# Patient Record
Sex: FEMALE | Race: WHITE
[De-identification: names, ages, dates, MRNs, and addresses within clinical notes are randomized per-mention and may not be internally consistent; named-entity substitution may affect disease eponyms.]

---

## 2018-11-28 ENCOUNTER — HOSPITAL ENCOUNTER (INPATIENT)
Dept: HOSPITAL 88 - ER | Age: 68
LOS: 4 days | Discharge: HOME | DRG: 389 | End: 2018-12-02
Attending: INTERNAL MEDICINE | Admitting: INTERNAL MEDICINE
Payer: MEDICARE

## 2018-11-28 VITALS — SYSTOLIC BLOOD PRESSURE: 137 MMHG | DIASTOLIC BLOOD PRESSURE: 69 MMHG

## 2018-11-28 VITALS — BODY MASS INDEX: 33.38 KG/M2 | HEIGHT: 60 IN | WEIGHT: 170 LBS

## 2018-11-28 DIAGNOSIS — T50.2X5A: ICD-10-CM

## 2018-11-28 DIAGNOSIS — K59.03: ICD-10-CM

## 2018-11-28 DIAGNOSIS — M06.9: ICD-10-CM

## 2018-11-28 DIAGNOSIS — I73.9: ICD-10-CM

## 2018-11-28 DIAGNOSIS — I08.3: ICD-10-CM

## 2018-11-28 DIAGNOSIS — D63.8: ICD-10-CM

## 2018-11-28 DIAGNOSIS — I77.819: ICD-10-CM

## 2018-11-28 DIAGNOSIS — E78.5: ICD-10-CM

## 2018-11-28 DIAGNOSIS — E66.01: ICD-10-CM

## 2018-11-28 DIAGNOSIS — E86.0: ICD-10-CM

## 2018-11-28 DIAGNOSIS — J44.9: ICD-10-CM

## 2018-11-28 DIAGNOSIS — T40.605A: ICD-10-CM

## 2018-11-28 DIAGNOSIS — I72.8: ICD-10-CM

## 2018-11-28 DIAGNOSIS — E87.1: ICD-10-CM

## 2018-11-28 DIAGNOSIS — E87.6: ICD-10-CM

## 2018-11-28 DIAGNOSIS — T38.0X5A: ICD-10-CM

## 2018-11-28 DIAGNOSIS — G89.29: ICD-10-CM

## 2018-11-28 DIAGNOSIS — M54.5: ICD-10-CM

## 2018-11-28 DIAGNOSIS — K56.7: Primary | ICD-10-CM

## 2018-11-28 DIAGNOSIS — N39.0: ICD-10-CM

## 2018-11-28 DIAGNOSIS — K43.9: ICD-10-CM

## 2018-11-28 DIAGNOSIS — T39.395A: ICD-10-CM

## 2018-11-28 DIAGNOSIS — I48.91: ICD-10-CM

## 2018-11-28 DIAGNOSIS — E66.9: ICD-10-CM

## 2018-11-28 LAB
ALBUMIN SERPL-MCNC: 4 G/DL (ref 3.5–5)
ALBUMIN/GLOB SERPL: 1.3 {RATIO} (ref 0.8–2)
ALP SERPL-CCNC: 65 IU/L (ref 40–150)
ALT SERPL-CCNC: 15 IU/L (ref 0–55)
ANION GAP SERPL CALC-SCNC: 15 MMOL/L (ref 8–16)
BACTERIA URNS QL MICRO: (no result) /HPF
BASOPHILS # BLD AUTO: 0.1 10*3/UL (ref 0–0.1)
BASOPHILS NFR BLD AUTO: 0.4 % (ref 0–1)
BILIRUB UR QL: NEGATIVE
BUN SERPL-MCNC: 16 MG/DL (ref 7–26)
BUN/CREAT SERPL: 21 (ref 6–25)
CALCIUM SERPL-MCNC: 9.7 MG/DL (ref 8.4–10.2)
CHLORIDE SERPL-SCNC: 87 MMOL/L (ref 98–107)
CK MB SERPL-MCNC: 3.4 NG/ML (ref 0–5)
CK SERPL-CCNC: 84 IU/L (ref 29–168)
CLARITY UR: CLEAR
CO2 SERPL-SCNC: 24 MMOL/L (ref 22–29)
COLOR UR: YELLOW
DEPRECATED NEUTROPHILS # BLD AUTO: 8.7 10*3/UL (ref 2.1–6.9)
DEPRECATED RBC URNS MANUAL-ACNC: (no result) /HPF (ref 0–5)
EGFRCR SERPLBLD CKD-EPI 2021: > 60 ML/MIN (ref 60–?)
EOSINOPHIL # BLD AUTO: 0.1 10*3/UL (ref 0–0.4)
EOSINOPHIL NFR BLD AUTO: 0.5 % (ref 0–6)
EPI CELLS URNS QL MICRO: (no result) /LPF
ERYTHROCYTE [DISTWIDTH] IN CORD BLOOD: 14.6 % (ref 11.7–14.4)
GLOBULIN PLAS-MCNC: 3 G/DL (ref 2.3–3.5)
GLUCOSE SERPLBLD-MCNC: 89 MG/DL (ref 74–118)
HCT VFR BLD AUTO: 33.8 % (ref 34.2–44.1)
HGB BLD-MCNC: 11.9 G/DL (ref 12–16)
KETONES UR QL STRIP.AUTO: (no result)
LEUKOCYTE ESTERASE UR QL STRIP.AUTO: NEGATIVE
LYMPHOCYTES # BLD: 2.4 10*3/UL (ref 1–3.2)
LYMPHOCYTES NFR BLD AUTO: 18.7 % (ref 18–39.1)
LYMPHOCYTES NFR BLD MANUAL: 18 % (ref 19–48)
MCH RBC QN AUTO: 33.1 PG (ref 28–32)
MCHC RBC AUTO-ENTMCNC: 35.2 G/DL (ref 31–35)
MCV RBC AUTO: 94.2 FL (ref 81–99)
MONOCYTES # BLD AUTO: 1.6 10*3/UL (ref 0.2–0.8)
MONOCYTES NFR BLD AUTO: 12.5 % (ref 4.4–11.3)
MONOCYTES NFR BLD MANUAL: 15 % (ref 3.4–9)
NEUTS SEG NFR BLD AUTO: 67.4 % (ref 38.7–80)
NEUTS SEG NFR BLD MANUAL: 65 % (ref 40–74)
NITRITE UR QL STRIP.AUTO: NEGATIVE
PLAT MORPH BLD: (no result)
PLATELET # BLD AUTO: 474 X10E3/UL (ref 140–360)
PLATELET # BLD EST: (no result) 10*3/UL
POTASSIUM SERPL-SCNC: 3 MMOL/L (ref 3.5–5.1)
PROT UR QL STRIP.AUTO: NEGATIVE
RBC # BLD AUTO: 3.59 X10E6/UL (ref 3.6–5.1)
RBC MORPH BLD: NORMAL
SODIUM SERPL-SCNC: 123 MMOL/L (ref 136–145)
SP GR UR STRIP: 1.01 (ref 1.01–1.02)
UROBILINOGEN UR STRIP-MCNC: 0.2 MG/DL (ref 0.2–1)
WBC #/AREA URNS HPF: (no result) /HPF (ref 0–5)

## 2018-11-28 PROCEDURE — 80053 COMPREHEN METABOLIC PANEL: CPT

## 2018-11-28 PROCEDURE — 93306 TTE W/DOPPLER COMPLETE: CPT

## 2018-11-28 PROCEDURE — 82746 ASSAY OF FOLIC ACID SERUM: CPT

## 2018-11-28 PROCEDURE — 94640 AIRWAY INHALATION TREATMENT: CPT

## 2018-11-28 PROCEDURE — 80048 BASIC METABOLIC PNL TOTAL CA: CPT

## 2018-11-28 PROCEDURE — 84466 ASSAY OF TRANSFERRIN: CPT

## 2018-11-28 PROCEDURE — 86850 RBC ANTIBODY SCREEN: CPT

## 2018-11-28 PROCEDURE — 82607 VITAMIN B-12: CPT

## 2018-11-28 PROCEDURE — 71275 CT ANGIOGRAPHY CHEST: CPT

## 2018-11-28 PROCEDURE — 51700 IRRIGATION OF BLADDER: CPT

## 2018-11-28 PROCEDURE — 74174 CTA ABD&PLVS W/CONTRAST: CPT

## 2018-11-28 PROCEDURE — 82550 ASSAY OF CK (CPK): CPT

## 2018-11-28 PROCEDURE — 99283 EMERGENCY DEPT VISIT LOW MDM: CPT

## 2018-11-28 PROCEDURE — 85730 THROMBOPLASTIN TIME PARTIAL: CPT

## 2018-11-28 PROCEDURE — 82553 CREATINE MB FRACTION: CPT

## 2018-11-28 PROCEDURE — 36415 COLL VENOUS BLD VENIPUNCTURE: CPT

## 2018-11-28 PROCEDURE — 96361 HYDRATE IV INFUSION ADD-ON: CPT

## 2018-11-28 PROCEDURE — 85025 COMPLETE CBC W/AUTO DIFF WBC: CPT

## 2018-11-28 PROCEDURE — 83540 ASSAY OF IRON: CPT

## 2018-11-28 PROCEDURE — 74018 RADEX ABDOMEN 1 VIEW: CPT

## 2018-11-28 PROCEDURE — 81001 URINALYSIS AUTO W/SCOPE: CPT

## 2018-11-28 PROCEDURE — 86900 BLOOD TYPING SEROLOGIC ABO: CPT

## 2018-11-28 PROCEDURE — 84484 ASSAY OF TROPONIN QUANT: CPT

## 2018-11-28 RX ADMIN — SODIUM CHLORIDE SCH MLS/HR: 9 INJECTION, SOLUTION INTRAVENOUS at 18:04

## 2018-11-28 NOTE — XMS REPORT
Wilbert Locke MD

                             Created on: 2018



LIZY GREEN

External Reference #: [id number]

: 1950

Sex: Female



Demographics







                          Address                   [address] [place], [place]  [id number]

 

                          Home Phone                [phone]

 

                          Preferred Language        Unknown

 

                          Marital Status            Unknown

 

                          Taoist Affiliation     Unknown

 

                          Race                      Unknown

 

                          Ethnic Group              Non-





Author







                          Author                    Wilbert Locke

 

                          Organization              [employer]

 

                          Address                   Unknown

 

                          Phone                     Unavailable







Care Team Providers







                    Care Team Member Name    Role                Phone

 

                    Wilbert Locke     CP                  Unavailable



                                                                



Allergies

          No Known Allergies                                                    
                                   



Problems

          





             Problem Type    Condition    Code         Onset Dates    Condition Status

 

             Problem      Osteoporosis    M81.0                     Active

 

             Problem      Knee osteoarthritis    M17.9                     Active

 

             Problem      Vitamin D deficiency, unspecified    E55.9                     Active

 

                    Problem             Rheumatoid arthritis without rheumatoid factor, multiple sites    M06.09 

                                                    Active

 

             Problem      Other long term (current) drug therapy    Z79.899                   Active



                                                                                
                                               



Medications

          No Known Medications                                                  
                           



Results

          No Known Results                                                      
             



Summary Purpose

          [employer] Submission

## 2018-11-28 NOTE — XMS REPORT
Wilbert Locke MD

                             Created on: 2017



Catie GREEN

External Reference #: [id number]

: 1950

Sex: Female



Demographics







                          Address                   [address] [place], [place]  [id number]

 

                          Home Phone                [phone]

 

                          Preferred Language        Unknown

 

                          Marital Status            Unknown

 

                          Episcopalian Affiliation     Unknown

 

                          Race                      Unknown

 

                          Ethnic Group              Non-





Author







                          Author                    Justin Cat

 

                          Organization              [employer]

 

                          Address                   Unknown

 

                          Phone                     Unavailable







Care Team Providers







                    Care Team Member Name    Role                Phone

 

                    Justin Cat       CP                  Unavailable



                                                                



Allergies

          No Known Allergies                                                    
                                   



Problems

          





             Problem Type    Condition    Code         Onset Dates    Condition Status

 

             Problem      Other constipation    K59.09                    Active

 

                Problem         Encounter for long-term (current) use of other medications    Z79.899           

                                        Active

 

             Problem      Primary osteoarthritis of right knee    M17.11                    Active

 

             Problem      Pain in joint    M25.50                    Active

 

             Problem      Opioid use with opioid-induced disorder    F11.99                    Active

 

             Problem      Pain in left knee    M25.562                   Active

 

             Problem      Chronic pain syndrome    G89.4                     Active

 

             Problem      Rheumatoid arthritis    M06.9                     Active

 

             Problem      Pain in right knee    M25.561                   Active



                                                                                
                                                                                
      



Medications

          No Known Medications                                                  
                           



Results

          No Known Results                                                      
             



Summary Purpose

          [employer] Submission

## 2018-11-28 NOTE — XMS REPORT
Wilbert Locke MD

                             Created on: 2017



Catie GREEN

External Reference #: [id number]

: 1950

Sex: Female



Demographics







                          Address                   [address] [place], [place]  [id number]

 

                          Home Phone                [phone]

 

                          Preferred Language        Unknown

 

                          Marital Status            Unknown

 

                          Restorationism Affiliation     Unknown

 

                          Race                      Unknown

 

                          Ethnic Group              Non-





Author







                          Author                    Justin Cat

 

                          [facility]              [employer]

 

                          Address                   Unknown

 

                          Phone                     Unavailable







Care Team Providers







                    Care Team Member Name    Role                Phone

 

                    Justin Cat       CP                  Unavailable



                                                                



Allergies, Adverse Reactions, Alerts

          





                    Substance           Reaction            Event Type

 

                    AMOXICILLIAN        rash                Non Drug Allergy

 

                    CODINE              rash                Non Drug Allergy



                                                                                
                 



Problems

          





             Problem Type    Condition    Code         Onset Dates    Condition Status

 

             Problem      Pain in joint, lower leg    719.46                    Active

 

             Problem      Other constipation    K59.09                    Active

 

             Problem      Rheumatoid arthritis    714.0                     Active

 

             Problem      Rheumatoid arthritis    M06.9                     Active

 

             Assessment    Other constipation    K59.09                    Active

 

             Problem      Pain in joint    M25.50                    Active

 

             Assessment    Drug induced constipation    K59.03                    Active

 

             Assessment    Left shoulder pain, unspecified chronicity    M25.512                   Active

 

             Problem      Primary osteoarthritis of right knee    M17.11                    Active

 

             Problem      Pain in right knee    M25.561                   Active

 

                Problem         Encounter for long-term (current) use of other medications    Z79.899           

                                        Active

 

             Problem      Chronic pain syndrome    G89.4                     Active

 

             Problem      Pain in left knee    M25.562                   Active

 

                    Assessment          Encounter for long-term (current) use of other medications    Z79.899 

                                                    Active

 

             Assessment    Pain in right knee    M25.561                   Active

 

             Assessment    Pain in left knee    M25.562                   Active

 

             Assessment    Pain in joint    M25.50                    Active

 

             Problem      Long-term (current) use of other medications - High Risk    V58.69                    Active



 

             Problem      Chronic pain syndrome    338.4                     Active

 

             Assessment    Rheumatoid arthritis    M06.9                     Active

 

             Problem      Other constipation    564.09                    Active

 

             Assessment    Chronic pain syndrome    G89.4                     Active

 

             Problem      Polyarthritis, multiple sites    716.59                    Active



                                                                                
                                                                                
                                                                                
                                                                 



Medications

          





        Medication    Code System    Code    Instructions    Start Date    End Date    Status    Dosage



 

        [place]    NDC     [address]-20     MG Orally every 6 hrs                    Active    1 tablet 

as needed

 

        MethylPREDNISolone    NDC     [address]-33                            Active    not defined

 

        Methotrexate    NDC     0                               Active    not defined

 

        Levofloxacin    NDC     [address]-58    500 MG Orally Once a day                    Active    1 tablet



 

        Esomeprazole Magnesium    NDC     [address]-09                            Active    not defined

 

        Losartan    NDC     0                               Active    not defined

 

        Amitiza    NDC     41333514789    24 MCG                      Active    Take 1 capsule by mouth  twice

 a day with food

 

        Duloxetine HCl    NDC     [address]-78                            Active    not defined

 

        Methotrexate    NDC     38971348063    2.5 MG                      Active    Take 8 tablets by mouth 

 once weekly

 

        PredniSONE    NDC     12061419187    5 MG                      Active    TAKE 1 TABLET BY MOUTH ONCE 

DAILY WITH FOOD OR MILK

 

          Doxycycline Hyclate    NDC       [address]-45    100 MG Orally every 12 hrs                        Active

                                        1 capsule

 

        Fluconazole    NDC     [address]-95    100 MG Orally                     Active    1 tablet

 

        Norco    NDC     [address]-86     MG Orally every 6 hrs                    Active    1 tablet 

as needed

 

        Temazepam    NDC     [address]-10                            Active    not defined

 

        Atorvastatin Calcium    NDC     [address]-14                            Active    not defined

 

        Folic Acid    NDC     50932781002    1 MG                      Active    Take 1 tablet by mouth two  

times daily

 

        Proctozone-HC    NDC     [address]-37                            Active    not defined

 

        Doc-Q-Lace    NDC     [address]-28                            Active    not defined

 

        Diltiazem HCl    NDC     [address]-77                            Active    not defined



                                                                                
                                                                                
                                                                                
                                   



Vital Signs

          





                          Date/Time:                May 22, 2017

 

                          BMI                       43.23 Index

 

                          Weight                    228.8 lbs

 

                          Height                    61 in

 

                          Temperature               98.7 F

 

                          Cardiac Monitoring Heart Rate    84 /min

 

                          Blood Pressure Diastolic    72 mm Hg

 

                          Blood Pressure Systolic    124 mm Hg



                                                                              



Results

          No Known Results                                                      
             



Summary Purpose

          [employer] Submission

## 2018-11-28 NOTE — DIAGNOSTIC IMAGING REPORT
CT angiogram of the chest, abdomen and pelvis.



CPT code number: [id number]



History: Midthoracic back pain and chest pain when bending forward.



Technique: Multidetector helical CT angiography was carried out from the

thoracic inlet to the lesser trochanters before and following intravenous

contrast administration.  Thin section image collimation was utilized and 3-D

images were postprocessed on a separate workstation.  Parenchymal organ imaging

will be limited by arterial phase of contrast administration.



Dose reduction techniques used: Automated exposure control, adjustment of the

mAs and/or kVp according to patient size, standardized low-dose protocol,

and/or iterative reconstruction technique.



RADIATION DOSE:

     Total DLP: 1255.3 mGy*cm    

     Estimated effective dose: (DLP x 0.015 x size factor) mSv

     CTDIvol has been reviewed. It is below the limits set by the Radiation

Protocol Committee (RPC).



Comparison: CT abdomen/pelvis 12/27/2016, CT abdomen/pelvis 11/26/2015



CT scan thorax:



Thyroid/base of neck:  Low attenuating lesion the left lobe measures 5 mm. A

nodule in the right lobe measures 1.9 x 1.3 cm.  The remainder of the lower

neck is unremarkable.



Pleura: No pleural effusion or pleural based mass.



Heart: Mild cardiomegaly with left ventricular hypertrophy. No pericardial

effusion. Mild calcifications of the mitral valve.



Lymph nodes: No enlarged axillary, supraclavicular, mediastinal, or hilar lymph

nodes..



Pulmonary vessels: Main pulmonary artery measures 3.0 cm. No filling defects.



Pulmonary parenchyma: 



Right lung: Diffusely hyperinflated. No infiltrate or mass. Wispy bands of

subsegmental atelectasis in the middle and lower lobes.



Left lung: Diffusely hyperinflated. No mass or infiltrate.



CT scan of the abdomen:



Liver: Normal in attenuation without mass.



Spleen: Normal in attenuation and size without mass.



Pancreas: Mild fatty atrophy. No mass or ductal dilatation.



Adrenal glands: No evidence for mass..



Gallbladder: Absent. 



Biliary tree: The intrahepatic and intrahepatic bile ducts are diffusely

distended. The common bile duct measures 12 mm in diameter and tapers as it

approaches the ampulla. No intraluminal filling defects to suggest

choledocholithiasis.



Retroperitoneum: No retroperitoneal hemorrhage.



Kidneys: Punctate calculus in the upper pole of the right kidney. Symmetric

enhancement of the kidneys. No hydronephrosis. Low attenuating lesion in the

posterior upper pole of the right kidney measures 0.9 x 1.0 cm.



Bowel and mesentery: 



Esophagus: Patulous with dependently layering fluid in the lower esophagus.

There is narrowing of the esophagus as the descending thoracic aorta crosses

midline.



Stomach: Collapsed.

Small bowel: Small bowel loops are distended to 2.8 cm. Several small bowel

loops contain fluid. A large ventral abdominal wall hernia is redemonstrated

containing multiple loops of small and large bowel. No evidence of mesenteric

edema.



Large bowel: The majority of the large bowel including the cecum and appendix

are in the hernia. Diverticulosis coli is present without associated

inflammation. No large bowel dilatation.



CT scan pelvis:



Urinary bladder: Well distended and is normal. No ureteral dilatation.



Lymph nodes: No evidence of lymphadenopathy.



The uterus is present and normal in morphology. No adnexal mass.



No free fluid or fluid collection.



Soft tissues: Diastases of the rectus abdominis is redemonstrated with hernia

as described above.



Bones: Diffusely demineralized. Moderate degenerative changes of the spine.

There is stable grade 1 anterolisthesis of L4 on L5 without pars defects. There

is a treated compression fracture of a midthoracic vertebral body, likely T6

with vertebral plana and protrusion of the posterior cortex into the spinal

canal. There are no additional compression deformities. There are moderate

generative changes of the left shoulder.



CT angiography:



1. Aortic sinus: 3.4 cm.

2. Sinotubular junction: 3.0 cm

3. Proximal ascending aorta:3.8 x 3.8 cm

4. Proximal aortic arch:3.5 cm

5. Mid aortic arch:2.9 cm

6. Distal aortic arch:2.7 x 3.0 cm

7. Descending thoracic aorta:2.9 cm

8. Aorta at the diaphragm:2.7 cm

9. Aorta at the celiac artery:2.6 cm

10. Aorta at the cranial most renal artery:2.1 x 2.2 cm

11.  Aorta at the caudal-most renal artery:2.0 x 2.3 cm

12. Infrarenal aorta: 2.0 x 2.0 cm

13.  Bifurcation:1.7 x 1.9 cm

14.  Morphology:The thoracoabdominal aorta is markedly ectatic. The descending

thoracic aorta crosses to the right at the left atrium and then turns back to

midline at the diaphragmatic hiatus. There is no evidence of mural hematoma or

displaced mural calcification in the thoracic or abdominal aorta. No periaortic

inflammation. No evidence of dissection or penetrating ulcer.



There is ectasia of the great vessels. The left vertebral artery arises

directed from the aortic arch. No evidence of stenosis or dissection of the

great vessel origins.



Celiac artery: Mildly narrowed at the origin to 4 mm with dilatation to 9 mm at

the bifurcation of the hepatic and splenic arteries. Branching of the celiac

artery is conventional.



SMA: Origin measures 8 mm. No stenosis.



Renal arteries: 2 arteries supply the right kidney. No significant stenosis at

the origins of the main renal arteries.



KING: Patent.

Iliac arteries: Right common iliac artery measures 1.3 cm. Left common iliac

artery measures 1.2 cm. There is ectasia of the internal and external iliac

arteries.

Femoral arteries: Right femoral artery measures 1.2 cm. Left femoral artery

measures 0.9 cm.



IMPRESSION:



1.  No evidence of aortic dissection or mural hematoma. No retroperitoneal

hemorrhage.

2.  Markedly ectatic thoracoabdominal aorta as well as ectasia of the great

vessels of the neck and iliac arteries. Top normal size of the ascending aorta.

No significant burden of atherosclerotic plaque. No significant stenoses of the

visceral vasculature of the abdomen. Aneurysmal dilatation of the distal celiac

artery as described above. No associated thrombus.

3. Prominent main pulmonary artery suggestive of pulmonary artery hypertension.

No evidence of pulmonary embolus

4.  Treated compression fracture of the midthoracic spine with retropulsed

fragments into the spinal canal. No new or untreated compression deformities.

Stable grade 1 anterolisthesis of L4 on L5.

5. COPD.

6. Patulousness of the midesophagus containing a small amount of fluid. This is

likely due to extrinsic compression from the ectatic descending thoracic aorta.

7. Cholecystectomy with intrahepatic and intrahepatic biliary ductal dilatation

secondary to reservoir effect and ampullary stenosis. No evidence of

choledocholithiasis.

8.  Large ventral abdominal wall hernia containing small and large bowel.

Mildly prominent small bowel loops are suggestive of ileus or very low-grade

partial small bowel obstruction. No fluid in the hernia sac.

9. Diverticulosis coli. No evidence for bowel obstruction or inflammation.

10. Low attenuating lesion in the right kidney is likely a cyst. This can be

confirmed with ultrasound on an outpatient basis.

11. Thyroid nodules as described above. Recommend evaluation of the thyroid

gland with ultrasound.





Signed by: Dr. Tena Mi MD on 11/28/2018 5:31 PM

## 2018-11-28 NOTE — XMS REPORT
Wilbert Locke MD

                             Created on: 2017



Catie GREEN

External Reference #: [id number]

: 1950

Sex: Female



Demographics







                          Address                   [address], [place]  [id number]

 

                          Home Phone                [phone]

 

                          Preferred Language        Unknown

 

                          Marital Status            Unknown

 

                          Nondenominational Affiliation     Unknown

 

                          Race                      Unknown

 

                          Ethnic Group              Non-





Author







                          Author                    Justin Cat

 

                          [facility]              [employer]

 

                          Address                   Unknown

 

                          Phone                     Unavailable







Care Team Providers







                    Care Team Member Name    Role                Phone

 

                    Justin Cat       CP                  Unavailable



                                                                



Allergies, Adverse Reactions, Alerts

          





                    Substance           Reaction            Event Type

 

                    AMOXICILLIAN        rash                Non Drug Allergy

 

                    CODINE              rash                Non Drug Allergy



                                                                                
                 



Problems

          





             Problem Type    Condition    Code         Onset Dates    Condition Status

 

             Assessment    Chronic pain syndrome    G89.4                     Active

 

             Problem      Other constipation    K59.09                    Active

 

                Problem         Encounter for long-term (current) use of other medications    Z79.899           

                                        Active

 

             Problem      Primary osteoarthritis of right knee    M17.11                    Active

 

             Problem      Pain in joint    M25.50                    Active

 

             Problem      Opioid use with opioid-induced disorder    F11.99                    Active

 

             Problem      Pain in left knee    M25.562                   Active

 

             Problem      Chronic pain syndrome    G89.4                     Active

 

             Problem      Rheumatoid arthritis    M06.9                     Active

 

             Problem      Pain in right knee    M25.561                   Active

 

             Assessment    Opioid use with opioid-induced disorder    F11.99                    Active

 

             Assessment    Left shoulder pain, unspecified chronicity    M25.512                   Active

 

             Assessment    Pain in joint    M25.50                    Active

 

                    Assessment          Encounter for long-term (current) use of other medications    Z79.899 

                                                    Active

 

             Assessment    Drug induced constipation    K59.03                    Active

 

             Assessment    Pain in right knee    M25.561                   Active

 

             Assessment    Pain in left knee    M25.562                   Active

 

             Assessment    Rheumatoid arthritis    M06.9                     Active



                                                                                
                                                                                
                                                                                
               



Medications

          





        Medication    Code System    Code    Instructions    Start Date    End Date    Status    Dosage



 

        Duloxetine HCl    NDC     69620239882                            Active    not defined

 

        Proctozone-HC    NDC     26089757921                            Active    not defined

 

        Methotrexate    NDC     16255712955    2.5 MG                      Active    Take 8 tablets by mouth 

 once weekly

 

        Fluconazole    NDC     86606756303    100 MG Orally                     Active    1 tablet

 

        Temazepam    NDC     80577695194                            Active    not defined

 

             Clindamycin Phos-Benzoyl Perox    NDC          92342655173    1-5 % Externally Once a day     

                                        Active              1 application to affected area

 

        Methotrexate    NDC     0                               Active    not defined

 

        movantik    NDC     0       25mg oral daily                    Active    one tab

 

        Atorvastatin Calcium    NDC     20835612166                            Active    not defined

 

        MethylPREDNISolone    NDC     15669456703                            Active    not defined

 

        Losartan    NDC     0                               Active    not defined

 

          Metronidazole    NDC       68439166137    0.75 % Externally 1-2 times daily                        Active

                                        1 application to affected area

 

        Movantik    NDC     64993096347    25 MG Orally Once a day                    Active    1 tablet in the

 morning

 

        Doc-Q-Lace    NDC     95850771243                            Active    not defined

 

        Diltiazem HCl    NDC     33263952202                            Active    not defined

 

        Norco    NDC     65052953525     MG Orally every 6 hrs                    Active    1 tablet as

 needed

 

        Norco    NDC     23088080790     MG Orally every 6 hrs                    Active    1 tablet as

 needed

 

        Folic Acid    NDC     92332467566    1 MG                      Active    Take 1 tablet by mouth two  

times daily

 

        Esomeprazole Magnesium    NDC     06516579223                            Active    not defined

 

        PredniSONE    NDC     88231195846    5 MG                      Active    TAKE 1 TABLET BY MOUTH ONCE 

DAILY WITH FOOD OR MILK



                                                                                
                                                                                
                                                                                
                                             



Vital Signs

          





                          Date/Time:                Nov 10, 2017

 

                          BMI                       39.67 Index

 

                          Weight                    210 lbs

 

                          Height                    61 in

 

                          Temperature               98.5 F

 

                          Cardiac Monitoring Heart Rate    72 /min

 

                          Blood Pressure Diastolic    66 mm Hg

 

                          Blood Pressure Systolic    122 mm Hg



                                                                              



Results

          No Known Results                                                      
             



Summary Purpose

          eClinicalWorks Submission

## 2018-11-28 NOTE — XMS REPORT
Wilbert Locke MD

                             Created on: 2017



Catie GREEN

External Reference #: [id number]

: 1950

Sex: Female



Demographics







                          Address                   [address], [place]  [id number]

 

                          Home Phone                [phone]

 

                          Preferred Language        Unknown

 

                          Marital Status            Unknown

 

                          Amish Affiliation     Unknown

 

                          Race                      Unknown

 

                          Ethnic Group              Non-





Author







                          Author                    Justin Cat

 

                          [facility]              [employer]

 

                          Address                   Unknown

 

                          Phone                     Unavailable







Care Team Providers







                    Care Team Member Name    Role                Phone

 

                    Justin Cat       CP                  Unavailable



                                                                



Allergies, Adverse Reactions, Alerts

          





                    Substance           Reaction            Event Type

 

                    AMOXICILLIAN        rash                Non Drug Allergy

 

                    CODINE              rash                Non Drug Allergy



                                                                              



Encounters

          





                    Encounter           Location            Date

 

                    [facility]         Wilbert Locke MD    2017



                                                                                
       



Problems

          





             Problem Type    Condition    ICD-9 Code    Onset Dates    Condition Status

 

             Problem      Pain in joint, lower leg    719.46                    Active

 

             Problem      Other constipation    K59.09                    Active

 

             Problem      Rheumatoid arthritis    714.0                     Active

 

             Problem      Rheumatoid arthritis    M06.9                     Active

 

             Assessment    Other constipation    K59.09                    Active

 

             Problem      Pain in joint    M25.50                    Active

 

             Assessment    Drug induced constipation    K59.03                    Active

 

             Problem      Primary osteoarthritis of right knee    M17.11                    Active

 

             Problem      Pain in right knee    M25.561                   Active

 

                Problem         Encounter for long-term (current) use of other medications    Z79.899           

                                        Active

 

             Problem      Chronic pain syndrome    G89.4                     Active

 

             Problem      Pain in left knee    M25.562                   Active

 

                    Assessment          Encounter for long-term (current) use of other medications    Z79.899 

                                                    Active

 

             Assessment    Pain in right knee    M25.561                   Active

 

             Assessment    Pain in left knee    M25.562                   Active

 

             Assessment    Pain in joint    M25.50                    Active

 

             Problem      Long-term (current) use of other medications - High Risk    V58.69                    Active



 

             Problem      Chronic pain syndrome    338.4                     Active

 

             Assessment    Rheumatoid arthritis    M06.9                     Active

 

             Problem      Other constipation    564.09                    Active

 

             Assessment    Chronic pain syndrome    G89.4                     Active

 

             Problem      Polyarthritis, multiple sites    716.59                    Active



                                                                                
                                                                                
                                                                                
                                                       



Medications

          





        Medication    Code System    Code    Instructions    Start Date    End Date    Status    Dosage



 

        Losartan    Unknown    0                               Active    Unknown

 

        Methotrexate    [facility]    59852355299    2.5 MG                      Active    TAKE 8 TABLETS BY MOUTH

 ONCE WEEKLY

 

           Amitiza    [facility]    [address]-02    24 MCG Orally Twice a day                         Active                    1 capsule with food

 

        Azithromycin    [facility]    [address]-67                            Active    Unknown

 

        Levofloxacin    [facility]    [address]-64    500 MG Orally Once a day                    Active    1

 tablet

 

        Doc-Q-Lace    [facility]    [address]-74                            Active    Unknown

 

        Norco    [facility]    [address]-14     MG Orally every 6 hrs                    Active    1 tablet

 as needed

 

        Fluconazole    [facility]    [address]-69    100 MG Orally                     Active    1 tablet

 

        Folic Acid    [facility]    25979027875    1 MG                      Active    Take 1 tablet by mouth 

once a day

 

          Doxycycline Hyclate    [facility]    [address]-80    100 MG Orally every 12 hrs                        

Active                                  1 capsule

 

        Esomeprazole Magnesium    [facility]    [address]-28                            Active    Unknown

 

        Methotrexate    Unknown    0                               Active    Unknown

 

        Duloxetine HCl    [facility]    [address]-46                            Active    Unknown

 

        Norco    [facility]    [address]-68     MG Orally every 6 hrs                    Active    1 tablet

 as needed

 

        Fentanyl    [facility]    [address]-47    25 MCG/HR Transdermal                     Active    1 patch

 to skin

 

        Proctozone-HC    [facility]    [address]-60                            Active    Unknown

 

        Atorvastatin Calcium    [facility]    [address]-69                            Active    Unknown

 

        Temazepam    [facility]    [address]-85                            Active    Unknown

 

        MethylPREDNISolone    [facility]    [address]-10                            Active    Unknown

 

        Diltiazem HCl    [facility]    [address]-26                            Active    Unknown



                                                                                
                                                                                
                                                                                
                                   



Social History

          





                    Social History Element    Qualifiers          Date Reported

 

                    Tobacco Use:        .   Are you a:: never smoker     2017

 

                    Pets:               none.               2017

 

                    Marital Status:     single.             2017

 

                    Diet:               no.                 2017

 

                    Caffeine:           no.                 2017

 

                    Exercise:           no.                 2017

 

                    Alcohol:            no.                 2017

 

                    Travel outside [place]:    no.                 2017

 

                    Occup.  exposure:    none.               2017

 

                    Occupation:         unemployed.         2017



                                                                                
                                                                                
                          



Vital Signs

          





                          Date/Time:                2017

 

                          Blood Pressure Systolic    148 mm Hg

 

                          Height                    61 in

 

                          Temperature               98.4 F

 

                          Blood Pressure Diastolic    100 mm Hg



                                                                    



Summary Purpose

          [employer]ClinicalWorks Submission

## 2018-11-28 NOTE — XMS REPORT
Wilbert Locke MD

                             Created on: 2018



LIZY GREEN

External Reference #: [id number]

: 1950

Sex: Female



Demographics







                          Address                   [address] [place] [address]  [id number]

 

                          Home Phone                [phone]

 

                          Preferred Language        Unknown

 

                          Marital Status            Unknown

 

                          [employer] Affiliation     Unknown

 

                          Race                      Unknown

 

                          Ethnic Group              Non-





Author







                          Author                    Emperatriz Adam

 

                          [facility]              [employer]

 

                          Address                   Unknown

 

                          Phone                     Unavailable







Care Team Providers







                    Care Team Member Name    Role                Phone

 

                    Emperatriz Adam                      Unavailable



                                                                



Allergies, Adverse Reactions, Alerts

          





                    Substance           Reaction            Event Type

 

                    Remicade            infections          Non Drug Allergy

 

                    NSAIDS              GI upset            Non Drug Allergy

 

                    Aleve               GI upset            Non Drug Allergy

 

                    Motrin              GI upset            Non Drug Allergy

 

                    Talwin              GI upset            Non Drug Allergy

 

                    Codeine             GI upset            Non Drug Allergy



                                                                                
                                                         



Problems

          





             Problem Type    Condition    Code         Onset Dates    Condition Status

 

             Assessment    Osteoporosis    M81.0                     Active

 

             Problem      Osteoporosis    M81.0                     Active

 

             Problem      Knee osteoarthritis    M17.9                     Active

 

             Problem      Vitamin D deficiency, unspecified    E55.9                     Active

 

                    Assessment          Rheumatoid arthritis without rheumatoid factor, multiple sites    M06.09

                                                    Active

 

             Assessment    Other long term (current) drug therapy    Z79.899                   Active

 

                    Problem             Rheumatoid arthritis without rheumatoid factor, multiple sites    M06.09 

                                                    Active

 

             Problem      Other long term (current) drug therapy    Z79.899                   Active



                                                                                
                                                                             



Medications

          





        Medication    Code System    Code    Instructions    Start Date    End Date    Status    Dosage



 

        Xanax    NDC     18877355259    0.5 MG Orally Three times a day                    Active    1 tablet



 

        PredniSONE    NDC     58544460798    1 MG Orally Once a day                    Active    4 tablet

 

          Losartan Potassium-HCTZ    NDC       68160417549    100-12.5 MG Orally Once a day                        

Active                                  1 tablet

 

        Mirtazapine    NDC     06976219368    15 MG Orally Once a day                    Active    1 tablet before

 bedtime in the evening

 

        Duloxetine HCl    NDC     57574700271    60 MG Orally Once a day                    Active    1 capsule



 

        Methotrexate    NDC     98723492603    2.5 MG Orally once a week                    Active    take 10

 tablets

 

           ProAir HFA    NDC        81543955161    108 (90 Base) MCG/ACT Inhalation every 4 hrs                

                          Active                    2 puffs as needed

 

          Esomeprazole Magnesium    NDC       65950508848    40 MG Orally Once a day                        Active 

                                        1 capsule

 

        Folic Acid    NDC     83272300142    1 MG                      Active    TAKE 1 TABLET BY MOUTH TWO  

TIMES DAILY

 

        Meloxicam    NDC     74702806664    7.5 MG Orally Once a day                    Active    1 tablet

 

        movantik    NDC     0       25mg oral daily                    Active    one tab

 

        Temazepam    NDC     22009343683    30 MG Orally Once a day                    Active    1 capsule at

 bedtime as needed

 

           Hydrocodone-Acetaminophen    NDC        62580433891     MG Orally every 6 hrs                 

                          Active                    1 tablet as needed

 

        Prolia    ND     30979909768    60 MG/ML Subcutaneous                     Active    as directed

 

          Gentamicin Sulfate    NDC       43396656881    0.1 % Externally Three times a day                        

Active                                  1 application to affected area

 

        Atorvastatin Calcium    NDC     59848816134    40 MG Orally Once a day                    Active    1

 tablet



                                                                                
                                                                                
                                                                                
     



Vital Signs

          





                          Date/Time:                2018

 

                          BMI                       35.90 Index

 

                          Weight                    190 lbs

 

                          Height                    61 in

 

                          Temperature               98.9 F

 

                          Cardiac Monitoring Heart Rate    76 /min

 

                          Blood Pressure Diastolic    88 mm Hg

 

                          Blood Pressure Systolic    130 mm Hg



                                                                              



Results

          No Known Results                                                      
             



Summary Purpose

          [employer]ClinicalWorks Submission

## 2018-11-28 NOTE — XMS REPORT
Wilbert Locke MD

                             Created on: 2017



Catie GREEN

External Reference #: [id number]

: 1950

Sex: Female



Demographics







                          Address                   [address] [place], [place]  [id number]

 

                          Home Phone                [phone]

 

                          Preferred Language        Unknown

 

                          Marital Status            Unknown

 

                          Voodoo Affiliation     Unknown

 

                          Race                      Unknown

 

                          Ethnic Group              Non-





Author







                          Author                    Justin Cat

 

                          Organization              [employer]

 

                          Address                   Unknown

 

                          Phone                     Unavailable







Care Team Providers







                    Care Team Member Name    Role                Phone

 

                    Justin Cat       CP                  Unavailable



                                                                



Allergies

          No Known Allergies                                                    
                                   



Problems

          





             Problem Type    Condition    Code         Onset Dates    Condition Status

 

             Problem      Rheumatoid arthritis    714.0                     Active

 

                Problem         Encounter for long-term (current) use of other medications    Z79.899           

                                        Active

 

             Problem      Other constipation    K59.09                    Active

 

             Problem      Primary osteoarthritis of right knee    M17.11                    Active

 

             Problem      Rheumatoid arthritis    M06.9                     Active

 

             Problem      Opioid use with opioid-induced disorder    F11.99                    Active

 

             Problem      Pain in left knee    M25.562                   Active

 

             Problem      Pain in right knee    M25.561                   Active

 

             Problem      Pain in joint    M25.50                    Active

 

             Problem      Chronic pain syndrome    G89.4                     Active

 

             Problem      Chronic pain syndrome    338.4                     Active

 

             Problem      Other constipation    564.09                    Active

 

             Problem      Polyarthritis, multiple sites    716.59                    Active

 

             Problem      Long-term (current) use of other medications - High Risk    V58.69                    Active



 

             Problem      Pain in joint, lower leg    719.46                    Active



                                                                                
                                                                                
                                                                  



Medications

          No Known Medications                                                  
                           



Results

          No Known Results                                                      
             



Summary Purpose

          [employer] Submission

## 2018-11-28 NOTE — XMS REPORT
Wilbert Locke MD

                             Created on: 2017



Catie GREEN

External Reference #: [id number]

: 1950

Sex: Female



Demographics







                          Address                   [address] [place], [place]  [id number]

 

                          Home Phone                [phone]

 

                          Preferred Language        Unknown

 

                          Marital Status            Unknown

 

                          Islam Affiliation     Unknown

 

                          Race                      Unknown

 

                          Ethnic Group              Non-





Author







                          Author                    Justin Cat

 

                          Organization              [employer]

 

                          Address                   Unknown

 

                          Phone                     Unavailable







Care Team Providers







                    Care Team Member Name    Role                Phone

 

                    Justin Cat                         Unavailable



                                            



Encounters

          





                    Encounter           Location            Date

 

                    [facility]         Wilbert Locke MD    2017

 

                    needs pain patch    Wilbert Locke MD    2017

 

                    called back         Wilbert Locke MD    2017



                                                                                
                           



Problems

          





             Problem Type    Condition    ICD-9 Code    Onset Dates    Condition Status

 

             Problem      Pain in joint, lower leg    719.46                    Active

 

             Problem      Other constipation    K59.09                    Active

 

             Problem      Rheumatoid arthritis    714.0                     Active

 

             Problem      Rheumatoid arthritis    M06.9                     Active

 

             Problem      Pain in joint    M25.50                    Active

 

             Problem      Primary osteoarthritis of right knee    M17.11                    Active

 

             Problem      Pain in right knee    M25.561                   Active

 

                Problem         Encounter for long-term (current) use of other medications    Z79.899           

                                        Active

 

             Problem      Chronic pain syndrome    G89.4                     Active

 

             Problem      Pain in left knee    M25.562                   Active

 

             Problem      Long-term (current) use of other medications - High Risk    V58.69                    Active



 

             Problem      Chronic pain syndrome    338.4                     Active

 

             Problem      Other constipation    564.09                    Active

 

             Problem      Polyarthritis, multiple sites    716.59                    Active



                                                                                
                                                                                
                                                        



Social History

          





                    Social History Element    Qualifiers          Date Reported

 

                    Tobacco Use:        .   Are you a:: never smoker     2017

 

                    Pets:               none.               2017

 

                    Marital Status:     single.             2017

 

                    Diet:               no.                 2017

 

                    Caffeine:           no.                 2017

 

                    Exercise:           no.                 2017

 

                    Alcohol:            no.                 2017

 

                    Travel outside [place]:    no.                 2017

 

                    Occup.  exposure:    none.               2017

 

                    Occupation:         unemployed.         2017



                                                                                
                                                                             



Summary Purpose

          [employer] Submission

## 2018-11-28 NOTE — XMS REPORT
Wilbert Locke MD

                             Created on: 2017



Catie GREEN

External Reference #: [id number]

: 1950

Sex: Female



Demographics







                          Address                   [address] [place], [place]  [id number]

 

                          Home Phone                [phone]

 

                          Preferred Language        Unknown

 

                          Marital Status            Unknown

 

                          Moravian Affiliation     Unknown

 

                          Race                      Unknown

 

                          Ethnic Group              Non-





Author







                          Author                    Justin Cat

 

                          Organization              [employer]

 

                          Address                   Unknown

 

                          Phone                     Unavailable







Care Team Providers







                    Care Team Member Name    Role                Phone

 

                    Justin Cat       CP                  Unavailable



                                                                



Allergies

          No Known Allergies                                                    
                                   



Problems

          





             Problem Type    Condition    Code         Onset Dates    Condition Status

 

             Problem      Other constipation    K59.09                    Active

 

                Problem         Encounter for long-term (current) use of other medications    Z79.899           

                                        Active

 

             Problem      Primary osteoarthritis of right knee    M17.11                    Active

 

             Problem      Pain in joint    M25.50                    Active

 

             Problem      Opioid use with opioid-induced disorder    F11.99                    Active

 

             Problem      Pain in left knee    M25.562                   Active

 

             Problem      Chronic pain syndrome    G89.4                     Active

 

             Problem      Rheumatoid arthritis    M06.9                     Active

 

             Problem      Pain in right knee    M25.561                   Active



                                                                                
                                                                                
      



Medications

          No Known Medications                                                  
                           



Results

          No Known Results                                                      
             



Summary Purpose

          [employer] Submission

## 2018-11-28 NOTE — XMS REPORT
Continuity of Care Document

                             Created on: 2018



LIZY GREEN

External Reference #: [id number]

: 1950

Sex: Female



Demographics







                          Address                   [address] [place]  [address]

 

                          Home Phone                [phone]

 

                          Preferred Language        Unknown

 

                          Marital Status            Unknown

 

                          [place]ai Affiliation     Unknown

 

                          Race                      Unknown

 

                          Ethnic Group              Unknown





Author







                          Author                    [facility]              Interface

 

                          Address                   Unknown

 

                          Phone                     Unavailable



                                                    



Problems

                    





                    Problem                            Status                            Onset Date     

                          Classification                            Date Reported       

                          Comments                            Source                    

 

                    Rheumatoid arthritis                            Active                              

                          Problem                            09/15/2017              

                                                      Alejandro Locke                    

 

                          Encounter for long-term use of other medications                            Active

                                                        Problem                      

                    2018                                                        Alejandro Locke 

                   

 

                    Other constipation                            Active                                

                          Problem                            2018                

                                                      Alejandro Locke                    

 

                          Primary osteoarthritis of right knee                            Active            

                                                Problem                            2018

                                                        Alejandro Locke               

     

 

                    Rheumatoid arthritis                            Active                              

                          Problem                            2018              

                                                      Alejandro Locke                    

 

                          Opioid use with opioid-induced disorder                            Active         

                                                Problem                            2018

                                                        Alejandro Locke               

     

 

                    Pain in left knee                            Active                                 

                          Problem                            2018                 

                                                      Alejandro Locke                    

 

                    Pain in right knee                            Active                                

                          Problem                            2018                

                                                      Alejandro Locke                    

 

                    Pain in joint                            Active                                     

                          Problem                            2018                     

                                                      Alejandro Locke                    

 

                    Chronic pain syndrome                            Active                             

                           Problem                            2018             

                                                      Alejandro Locke                    

 

                    Chronic pain syndrome                            Active                             

                           Problem                            09/15/2017             

                                                      Alejandro Locke                    

 

                    Other constipation                            Active                                

                          Problem                            09/15/2017                

                                                      Alejandro Locke                    

 

                          Polyarthritis, multiple sites                            Active                   

                                                Problem                            09/15/2017     

                                                      Alejandro Locke                    



 

                          Long-term use of other medications - High Risk                            Active  

                                                      Problem                        

                    09/15/2017                                                        Alejandro Locke   

                 

 

                          Pain in joint, lower leg                            Active                        

                                                Problem                            09/15/2017          

                                                      Alejandro Locke                    

 

                          Drug induced constipation                            Active                       

                                                Diagnosis                            11/15/2017       

                                                      Alejandro Locke                    

 

                          Left shoulder pain, unspecified chronicity                            Active      

                                                  Diagnosis                            

11/15/2017                                                        Alejandro Locke     

               

 

                    Osteoporosis                            Active                                      

                          Problem                            2018                      

                                                      Alejandro Locke                    

 

                    Knee osteoarthritis                            Active                               

                          Problem                            2018               

                                                      Alejandro Locke                    

 

                          Vitamin D deficiency, unspecified                            Active               

                                                Problem                            2018 

                                                       Alejandro Locke                

    

 

                                        Rheumatoid arthritis without rheumatoid factor, multiple sites                  

                    Active                                                        Problem        

                    2018                                                        Alejandro Locke                    

 

                          Other long term drug therapy                            Active                    

                                                Problem                            2018      

                                                      Alejandro Locke                    



                                                                                
                                                                                
                                                                                
                                                                                
                                                                                
                   



Medications

                    





                    Medication                            Details                            Route      

                          Status                            Patient Instructions         

                          Ordering Provider                            Order Date           

                                        Source                    

 

                    PredniSONE                            4 tablet                            Orally    

                          Active                            1 MG Orally Once a day     

                          [facility]                            2018                  

                                        Alejandro Locke                    

 

                          Movantik                            1 tablet in the morning                       

                    Orally                            Active                            25 MG Orally Once

 a day                            [facility]                            2017       

                                        Alejandro Locke                    

 

                    movantik                            one tab                            oral         

                          Active                            25mg oral daily                 

                    [facility]                            2017                            Alejandro Locke                    

 

                          Amitiza                            Take 1 capsule by mouth  twice a day with food 

                           NA                            Active                      

                    24 MCG                            [facility]                            2017   

                                        Alejandro Locke                    

 

                    Levofloxacin                            1 tablet                            Orally  

                          Active                            500 MG Orally Once a day 

                           [facility]                            2017              

                                        Alejandro Locke                    

 

                    Doxycycline Hyclate                            1 capsule                            

Orally                            Active                            100 MG Orally every

 12 hrs                            [facility]                            2017      

                                        Alejandro Locke                    

 

                    Methotrexate                            10 tablets                            Orally

                            Active                            2.5mg Orally Once a week

                            [facility]                            2017             

                                        Alejandro Retanaer                    

 

                    Prolia                            as directed                            Subcutaneous

                            Active                            60 MG/ML Subcutaneous  

                          [facility]                            2017               

                                        Alejandro Locke                    

 

                    Amitiza                            1 capsule with food                            Orally

                            Active                            24 MCG Orally Twice a day

                            [facility]                            2017             

                                        Alejandro Retanaer                    

 

                          MethylPREDNISolone                            not defined                         

                    NA                            Active                                                

                    [facility]   

                 

 

                    Doc-Q-Lace                            not defined                            NA     

                       Active                                                        [facility]

                                                        Alejandro Locke               

     

 

                    Methotrexate                            not defined                            NA   

                         Active                                                        

[facility]                                                        Alejandro Locke         

           

 

                          PredniSONE                            TAKE 1 TABLET BY MOUTH ONCE DAILY WITH FOOD 

OR MILK                            NA                            Active              

                    5 MG                            [facility]                                   

                                        Alejandro Locke                    

 

                          Esomeprazole Magnesium                            not defined                     

                    NA                            Active                                            

                    [facility]                                                        Alejandro Locke

                    

 

                    Norco                            1 tablet as needed                            Orally

                            Active                             MG Orally every 

6 hrs                            [facility]                                              

                                        Alejandro Olcke                    

 

                    Methotrexate                            take 10 tablets                            Orally

                            Active                            2.5 MG Orally once a week

                            [facility]                                                   

                                        Alejandro Locke                    

 

                    Diltiazem HCl                            not defined                            NA  

                          Active                                                     

                    [facility]                                                        Alejandro Locke        

            

 

                          Folic Acid                            TAKE 1 TABLET BY MOUTH TWO  TIMES DAILY     

                       NA                            Active                            

1 MG                            [facility]                                               

                                        Alejandro Locke                    

 

                          Atorvastatin Calcium                            not defined                       

                    NA                            Active                                              

                    [facility]                                                        Alejandro Locke 

                   

 

                    Losartan                            not defined                            NA       

                     Active                                                        [facility]

                                                        Alejandro Locke               

     

 

                    Temazepam                            not defined                            NA      

                      Active                                                        [facility]

                                                        Alejandro Locke               

     

 

                    Proctozone-HC                            not defined                            NA  

                          Active                                                     

                    [facility]                                                        Alejandro Locke        

            

 

                    Duloxetine HCl                            not defined                            NA 

                           Active                                                    

                    [facility]                                                        Alejandro Locke       

             

 

                    Fluconazole                            1 tablet                            Orally   

                          Active                            100 MG Orally             

                    [facility]                                                        Alejandrochiki Locke

                    

 

                    Duloxetine HCl                            1 capsule                            Orally

                            Active                            60 MG Orally Once a day

                            [facility]                                                   

                                        Alejandro Locke                    

 

                    Proctozone-HC                            not defined                            NA  

                          Active                                                     

                    [facility]                                                        Alejandro Locke        

            

 

                    Fluconazole                            1 tablet                            Orally   

                          Active                            100 MG Orally             

                    [facility]                                                        Alejandrochiki Locke

                    

 

                          Temazepam                            1 capsule at bedtime as needed               

                    Orally                            Active                            30 MG 

Orally Once a day                            [facility]                                  

                                        Alejandro Locke                    

 

                          Clindamycin Phos-Benzoyl Perox                            1 application to affected

 area                            Externally                            Active        

                          1-5 % Externally Once a day                            [facility]    

                                                      Alejandro Locke                   

 

 

                    movantik                            one tab                            oral         

                          Active                            25mg oral daily                 

                    [facility]                                                        Alejandro Locke

                    

 

                          Atorvastatin Calcium                            not defined                       

                    NA                            Active                                              

                    [facility]                                                        Alejandro Retanaer 

                   

 

                          MethylPREDNISolone                            not defined                         

                    NA                            Active                                                

                    [facility]                                                        Alejandro Locke   

                 

 

                          Metronidazole                            1 application to affected area           

                          Externally                            Active                        

                          0.75 % Externally 1-2 times daily                            [facility]              

                                                      Alejandro Locke                    

 

                          Movantik                            1 tablet in the morning                       

                    Orally                            Active                            25 MG Orally Once

 a day                            [facility]                                             

                                        Alejandro Retanaer                    

 

                    Doc-Q-Lace                            not defined                            NA     

                       Active                                                        [facility]

                                                        Alejandro Locke               

     

 

                          Diltiazem HCl                            1 capsule on an empty stomach in the morning

                            Orally                            Active                 

                          240 MG Orally Once a day                            [facility]                

                                                      Alejandro Locke                    

 

                    Norco                            1 tablet as needed                            Orally

                            Active                             MG Orally every 

6 hrs                            [facility]                                              

                                        Alejandro Retanaer                    

 

                          Esomeprazole Magnesium                            1 capsule                       

                    Orally                            Active                            40 MG Orally Once

 a day                            [facility]                                             

                                        Alejandro Retanaer                    

 

                          Metronidazole                            1 application to affected area           

                          Externally                            Active                        

                          0.75 % Externally 1-2 times daily                            [facility]              

                                                      Alejandro Retanaer                    

 

                          Clindamycin Phos-Benzoyl Perox                            1 application to affected

 area                            Externally                            Active        

                          1-5 % Externally Once a day                            [facility]    

                                                      Alejandro Retanaer                   

 

 

                    Levofloxacin                            1 tablet                            Orally  

                          Active                            500 MG Orally Once a day 

                           [facility]                                                    

                                        Alejandro Retanaer                    

 

                          Amitiza                            Take 1 capsule by mouth  twice a day with food 

                           NA                            Active                      

                    24 MCG                            [facility]                                         

                                        Alejandro Retanaer                    

 

                    Doxycycline Hyclate                            1 capsule                            

Orally                            Active                            100 MG Orally every

 12 hrs                            [facility]                                            

                                        Alejandro Retanaer                    

 

                    Losartan                            Unknown                            NA           

                    [facility]                                                        [facility]                 

   

 

                          Methotrexate                            TAKE 8 TABLETS BY MOUTH ONCE WEEKLY       

                     NA                            Active                            2.5

 MG                            [facility]                                                

                                        Alejandro Retanaer                    

 

                    Azithromycin                            Unknown                            NA       

                     Active                                                        [facility]               

     

 

                          Folic Acid                            Take 1 tablet by mouth once a day           

                    NA                            Active                            1 MG  

                          [facility]                                                     

                                        Alejandro Locke                    

 

                    Methotrexate                            Unknown                            NA       

                     Active                                                        [facility]

                                                        Alejandro Retanaer               

     

 

                    Fentanyl                            1 patch to skin                            Transdermal

                            Active                            25 MCG/HR Transdermal  

                          [facility]                                                     

                                        Alejandro Locke                    

 

                          Gentamicin Sulfate                            1 application to affected area      

                          Externally                            Active                   

                          0.1 % Externally Three times a day                            [facility]        

                                                      Alejandro Locke                    

 

                          Losartan Potassium-HCTZ                            1 tablet                       

                    Orally                            Active                            100-12.5 MG Orally

 Once a day                            [facility]                                        

                                        Alejandro Locke                    

 

                          Mirtazapine                            1 tablet before bedtime in the evening     

                          Orally                            Active                      

                          15 MG Orally Once a day                            [facility]                      

                                                      Alejandro Locke                    

 

                    Xanax                            1 tablet                            Orally         

                          Active                            0.5 MG Orally Three times a day 

                           [facility]                                                    

                                        Alejandro Locke                    

 

                    PredniSONE                            4 tablet                            Orally    

                          Active                            1 MG Orally Once a day     

                       [facility]                                                        Alejandro Locke                    

 

                    Prolia                            as directed                            Subcutaneous

                            Active                            60 MG/ML Subcutaneous  

                          [facility]                                                     

                                        Alejandro Locke                    

 

                    Atorvastatin Calcium                            1 tablet                            

Orally                            Active                            40 MG Orally Once

 a day                            [facility]                                             

                                        Alejandro Locke                    

 

                    Meloxicam                            1 tablet                            Orally     

                          Active                            7.5 MG Orally Once a day    

                        [facility]                                                        Alejandro Locke                    

 

                    ProAir HFA                            2 puffs as needed                            Inhalation

                            Active                            108 (90 Base) MCG/ACT Inhalation

 every 4 hrs                            [facility]                                       

                                        Alejandro Locke                    

 

                          Hydrocodone-Acetaminophen                            1 tablet as needed           

                    Orally                            Active                            10-

325 MG Orally every 6 hrs                            [facility]                        

                                                      Alejandro Locke                    

 

                    Generlac                            15 ml                            Orally         

                          Active                            10 GM/15ML Orally Once a day    

                        [facility]                                                        Alejandro Locke                    

 

                          Ondansetron                            1 tablet on the tongue and allow to dissolve

                            Orally                            Active                 

                          4 MG Orally every 8 hrs                            [facility]                 

                                                      Alejandro Locke                    

 

                          Clindamycin Phos & Cleanser                            as directed                

                    Externally                            Active                            1 %

 Externally                            [facility]                                        

                                        Alejandro Locke                    

 

                          Metronidazole-Cleanser                            1 application                   

                    Externally                            Active                            0.75 %

 (Gel) Externally Twice a day                            [facility]                    

                                                      Alejandro Locke                    



                                                                                
                                                                                
                                                                                
                                                                                
                                                                                
                                                                                
                                                                                
                                                                                
                                                                                
                                                                                
                                                                                
                                                                                
                                                                                
                           



Allergies, Adverse Reactions, Alerts

                    





                    Substance                            Category                            Reaction   

                          Severity                            Reaction type           

                          Status                            Date Reported                     

                          Comments                            Source                    

 

                    AMOXICILLIAN                            Adverse Reaction                            

rash                                                        Adverse Reaction         

                          Active                            11/10/2017                      

                                                      Alejandro Locke                    

 

                    CODINE                            Adverse Reaction                            rash  

                                                      Adverse Reaction               

                    Active                            11/10/2017                              

                                        Alejandro Locke                    

 

                    Remicade                            Adverse Reaction                            infections

                                                        Adverse Reaction             

                    Active                            2018                            

                                        Alejandro Locke                    

 

                    NSAIDS                            Adverse Reaction                            GI upset

                                                        Adverse Reaction             

                    Active                            2018                            

                                        Alejandro Locke                    

 

                    Aleve                            Adverse Reaction                            GI upset

                                                        Adverse Reaction             

                    Active                            2018                            

                                        Alejandro Locke                    

 

                    Motrin                            Adverse Reaction                            GI upset

                                                        Adverse Reaction             

                    Active                            2018                            

                                        Alejandro Locke                    

 

                    Talwin                            Adverse Reaction                            GI upset

                                                        Adverse Reaction             

                    Active                            2018                            

                                        Alejandro Locke                    

 

                    Codeine                            Adverse Reaction                            GI upset

                                                        Adverse Reaction             

                    Active                            2018                            

                                        Alejandro Locke                    



                                                                                
                                               



Immunizations

                    





                    Immunization                            Date Given                            Site  

                          Status                            Last Updated             

                          Comments                            Source                    



                                                                        



Results

                    





                    Order Name                            Results                            Value      

                          Reference Range                            Date                

                          Interpretation                            Comments                       

                                        Source                    



                                                                                



Vital Signs

                    





                    Vital Sign                            Value                            Date         

                          Comments                            Source                    

 

                    Weight                            190                             2018        

                                                      Alejandro Locke                    

 

                    Height                            61                             2018         

                                                      Alejandro Locke                    

 

                    Temperature Oral (F)                            98.9 F                            2018

                                                        Alejandro Locke               

     

 

                    Heart Rate                            76                             2018     

                                                      Alejandro Locke                    



 

                    Diastolic (mm Hg)                            88                             2018

                                                        Alejandro Locke               

     

 

                    Systolic (mm Hg)                            130                             2018

                                                        Alejandro Locke               

     

 

                    Weight                            200                             2018        

                                                      Alejandro Locke                    

 

                    Height                            59                             2018         

                                                      Alejandro Locke                    

 

                    Temperature Oral (F)                            99.3 F                            2018

                                                        Alejandro Locke               

     

 

                    Heart Rate                            72                             2018     

                                                      Alejandro Locke                    



 

                    Diastolic (mm Hg)                            80                             2018

                                                        Alejandro Locke               

     

 

                    Systolic (mm Hg)                            150                             2018

                                                        Alejandro Locke               

     

 

                    Weight                            205                             2018        

                                                      Alejandro Locke                    

 

                    Height                            60                             2018         

                                                      Alejandro Locke                    

 

                    Temperature Oral (F)                            98.3 F                            2018

                                                        Alejandro Locke               

     

 

                    Heart Rate                            80                             2018     

                                                      Alejandro Locke                    



 

                    Diastolic (mm Hg)                            80                             2018

                                                        Alejandro Locke               

     

 

                    Systolic (mm Hg)                            146                             2018

                                                        Alejandro Locke               

     

 

                    Weight                            210                             11/10/2017        

                                                      Alejandro Locke                    

 

                    Height                            61                             11/10/2017         

                                                      Alejandro Locke                    

 

                    Temperature Oral (F)                            98.5 F                            11/10/2017

                                                        Alejandro Locke               

     

 

                    Heart Rate                            72                             11/10/2017     

                                                      Alejandro Locke                    



 

                    Diastolic (mm Hg)                            66                             11/10/2017

                                                        Alejandro Locke               

     

 

                    Systolic (mm Hg)                            122                             11/10/2017

                                                        Alejandro Locke               

     

 

                    Weight                            219.9                             2017      

                                                      Alejandro Locke                    

 

                    Height                            61                             2017         

                                                      Alejandro Locke                    

 

                    Temperature Oral (F)                            98.4 F                            2017

                                                        Alejandro Locke               

     

 

                    Heart Rate                            80                             2017     

                                                      Alejandro Locke                    



 

                    Diastolic (mm Hg)                            74                             2017

                                                        Alejandro Locke               

     

 

                    Systolic (mm Hg)                            128                             2017

                                                        Alejandro Locke               

     

 

                    Weight                            228.8                             2017      

                                                      Alejandro Locke                    

 

                    Height                            61                             2017         

                                                      Alejandro Locke                    

 

                    Temperature Oral (F)                            98.7 F                            2017

                                                        Alejandro Locke               

     

 

                    Heart Rate                            84                             2017     

                                                      Alejandro Locke                    



 

                    Diastolic (mm Hg)                            72                             2017

                                                        Alejandro Locke               

     

 

                    Systolic (mm Hg)                            124                             2017

                                                        Alejandro Locke               

     

 

                    Weight                            225.6                             2017      

                                                      Alejandro Locke                    

 

                    Height                            61                             2017         

                                                      Alejandro Locke                    

 

                    Temperature Oral (F)                            98.2 F                            2017

                                                        Alejandro Locke               

     

 

                    Heart Rate                            76                             2017     

                                                      Alejandro Locke                    



 

                    Diastolic (mm Hg)                            62                             2017

                                                        Alejandro Locke               

     

 

                    Systolic (mm Hg)                            136                             2017

                                                        Alejandro Locke               

     

 

                    Systolic (mm Hg)                            148                             2017

                                                        Alejandro Locke               

     

 

                    Height                            61                             2017         

                                                      Alejandro Locke                    

 

                    Temperature Oral (F)                            98.4 F                            2017

                                                        Alejandro Locke               

     

 

                    Diastolic (mm Hg)                            100                             2017

                                                        Alejandro Retanaer               

     



                                                                                
                                                                                
                                                                                
                                                                                
                                                                                
                                                                                
                                                                                
                                                                                
                                                                                
                                                                               



Encounters

                    





                    Location                            Location Details                            Encounter

 Type                            Encounter Number                            Reason For

 Visit                            Attending Provider                            ADM Date

                            DC Date                            Status                

                                        Source                    

 

                    Wilbert Locke MD                                                        [facility]

                                        [id number]                   

                                                                              2017                                                        Alejandro Locke MD                                                        [facility]

                                        [id number]                   

                                                                              2017                                                        [facility] MD                                                        [facility] [facility]

                                        [id number]                   

                                                                              2017                                                        Alejandro Locke MD                                                        needs pain

 patch                                  [id number]             

                                                                              2017                                                   

                                        Alejandro Locke MD                                                        needs pain

 patch                                  [id number]             

                                                                              2017                                                   

                                        Alejandro Locke MD                                                        called back

                                        [id number]                   

                                                                              2017                                                        Alejandro Locke                    



                                                                                
                                                               



Procedures

                    





                    Procedure                            Code                            Date           

                          Perfomer                            Comments                        

                                        Source

## 2018-11-28 NOTE — XMS REPORT
Wilbert Locke MD

                             Created on: 2017



Catie GREEN

External Reference #: [id number]

: 1950

Sex: Female



Demographics







                          Address                   [address] [place], [place]  [id number]

 

                          Home Phone                [phone]

 

                          Preferred Language        Unknown

 

                          Marital Status            Unknown

 

                          Religion Affiliation     Unknown

 

                          Race                      Unknown

 

                          Ethnic Group              Non-





Author







                          Author                    Justin Cat

 

                          Organization              [employer]

 

                          Address                   Unknown

 

                          Phone                     Unavailable







Care Team Providers







                    Care Team Member Name    Role                Phone

 

                    Justin Cat       CP                  Unavailable



                                                                



Allergies

          No Known Allergies                                                    
                                   



Problems

          





             Problem Type    Condition    Code         Onset Dates    Condition Status

 

             Problem      Rheumatoid arthritis    714.0                     Active

 

                Problem         Encounter for long-term (current) use of other medications    Z79.899           

                                        Active

 

             Problem      Other constipation    K59.09                    Active

 

             Problem      Primary osteoarthritis of right knee    M17.11                    Active

 

             Problem      Rheumatoid arthritis    M06.9                     Active

 

             Problem      Opioid use with opioid-induced disorder    F11.99                    Active

 

             Problem      Pain in left knee    M25.562                   Active

 

             Problem      Pain in right knee    M25.561                   Active

 

             Problem      Pain in joint    M25.50                    Active

 

             Problem      Chronic pain syndrome    G89.4                     Active

 

             Problem      Chronic pain syndrome    338.4                     Active

 

             Problem      Other constipation    564.09                    Active

 

             Problem      Polyarthritis, multiple sites    716.59                    Active

 

             Problem      Long-term (current) use of other medications - High Risk    V58.69                    Active



 

             Problem      Pain in joint, lower leg    719.46                    Active



                                                                                
                                                                                
                                                                  



Medications

          No Known Medications                                                  
                           



Results

          No Known Results                                                      
             



Summary Purpose

          [employer] Submission

## 2018-11-28 NOTE — XMS REPORT
Wilbert Locke MD

                             Created on: 10/31/2018



LIZY GREEN

External Reference #: [id number]

: 1950

Sex: Female



Demographics







                          Address                   [address] [place], [place]  [id number]

 

                          Home Phone                [phone]

 

                          Preferred Language        Unknown

 

                          Marital Status            Unknown

 

                          [employer] Affiliation     Unknown

 

                          Race                      Unknown

 

                          Ethnic Group              Non-





Author







                          Author                    Wilbert Locke

 

                          Organization              [facility]

 

                          Address                   Unknown

 

                          Phone                     Unavailable







Care Team Providers







                    Care Team Member Name    Role                Phone

 

                    Wilbert Locke     CP                  Unavailable



                                                                



Allergies

          No Known Allergies                                                    
                                   



Problems

          





             Problem Type    Condition    Code         Onset Dates    Condition Status

 

             Problem      Osteoporosis    M81.0                     Active

 

             Problem      Knee osteoarthritis    M17.9                     Active

 

             Problem      Vitamin D deficiency, unspecified    E55.9                     Active

 

                    Problem             Rheumatoid arthritis without rheumatoid factor, multiple sites    M06.09 

                                                    Active

 

             Problem      Other long term (current) drug therapy    Z79.899                   Active



                                                                                
                                               



Medications

          No Known Medications                                                  
                           



Results

          No Known Results                                                      
             



Summary Purpose

          [employer] Submission

## 2018-11-28 NOTE — XMS REPORT
Wilbert Locke MD

                             Created on: 2018



LIZY GREEN

External Reference #: [id number]

: 1950

Sex: Female



Demographics







                          Address                   [address] [place], [place]  [id number]

 

                          Home Phone                [phone]

 

                          Preferred Language        Unknown

 

                          Marital Status            Unknown

 

                          Zoroastrian Affiliation     Unknown

 

                          Race                      Unknown

 

                          Ethnic Group              Non-





Author







                          Author                    Wilbert Locke

 

                          Organization              [facility]

 

                          Address                   Unknown

 

                          Phone                     Unavailable







Care Team Providers







                    Care Team Member Name    Role                Phone

 

                    Wilbert Locke     CP                  Unavailable



                                                                



Allergies

          No Known Allergies                                                    
                                   



Problems

          





             Problem Type    Condition    Code         Onset Dates    Condition Status

 

             Problem      Osteoporosis    M81.0                     Active

 

             Problem      Knee osteoarthritis    M17.9                     Active

 

             Problem      Vitamin D deficiency, unspecified    E55.9                     Active

 

                    Problem             Rheumatoid arthritis without rheumatoid factor, multiple sites    M06.09 

                                                    Active

 

             Problem      Other long term (current) drug therapy    Z79.899                   Active



                                                                                
                                               



Medications

          No Known Medications                                                  
                           



Results

          No Known Results                                                      
             



Summary Purpose

          [employer] Submission

## 2018-11-28 NOTE — XMS REPORT
Wilbert Locke MD

                             Created on: 2017



Catie GRENE

External Reference #: [id number]

: 1950

Sex: Female



Demographics







                          Address                   [address] [place], [place]  [id number]

 

                          Home Phone                [phone]

 

                          Preferred Language        Unknown

 

                          Marital Status            Unknown

 

                          Denominational Affiliation     Unknown

 

                          Race                      Unknown

 

                          Ethnic Group              Non-





Author







                          Author                    Justin Cat

 

                          Organization              [facility]

 

                          Address                   Unknown

 

                          Phone                     Unavailable







Care Team Providers







                    Care Team Member Name    Role                Phone

 

                    Justin Cat       CP                  Unavailable



                                            



Encounters

          





                    Encounter           Location            Date

 

                    [facility]         Wilbert Locke MD    2017

 

                    needs pain patch    Wilbert Locke MD    2017



                                                                                
                 



Problems

          





             Problem Type    Condition    ICD-9 Code    Onset Dates    Condition Status

 

             Problem      Pain in joint, lower leg    719.46                    Active

 

             Problem      Other constipation    K59.09                    Active

 

             Problem      Rheumatoid arthritis    714.0                     Active

 

             Problem      Rheumatoid arthritis    M06.9                     Active

 

             Problem      Pain in joint    M25.50                    Active

 

             Problem      Primary osteoarthritis of right knee    M17.11                    Active

 

             Problem      Pain in right knee    M25.561                   Active

 

                Problem         Encounter for long-term (current) use of other medications    Z79.899           

                                        Active

 

             Problem      Chronic pain syndrome    G89.4                     Active

 

             Problem      Pain in left knee    M25.562                   Active

 

             Problem      Long-term (current) use of other medications - High Risk    V58.69                    Active



 

             Problem      Chronic pain syndrome    338.4                     Active

 

             Problem      Other constipation    564.09                    Active

 

             Problem      Polyarthritis, multiple sites    716.59                    Active



                                                                                
                                                                                
                                                        



Social History

          





                    Social History Element    Qualifiers          Date Reported

 

                    Tobacco Use:        .   Are you a:: never smoker     2017

 

                    Pets:               none.               2017

 

                    Marital Status:     single.             2017

 

                    Diet:               no.                 2017

 

                    Caffeine:           no.                 2017

 

                    Exercise:           no.                 2017

 

                    Alcohol:            no.                 2017

 

                    Travel outside [place]:    no.                 2017

 

                    Occup.  exposure:    none.               2017

 

                    Occupation:         unemployed.         2017



                                                                                
                                                                             



Summary Purpose

          [employer] Submission

## 2018-11-28 NOTE — XMS REPORT
Wilbert Locke MD

                             Created on: 2018



LIZY GREEN

External Reference #: [id number]

: 1950

Sex: Female



Demographics







                          Address                   [address] [place], [place]  [id number]

 

                          Home Phone                [phone]

 

                          Preferred Language        Unknown

 

                          Marital Status            Unknown

 

                          Buddhism Affiliation     Unknown

 

                          Race                      Unknown

 

                          Ethnic Group              Non-





Author







                          Author                    Emperatriz Adam

 

                          [facility]              eClinical[employer]

 

                          Address                   Unknown

 

                          Phone                     Unavailable







Care Team Providers







                    Care Team Member Name    Role                Phone

 

                    Emperatriz Adam                      Unavailable



                                                                



Allergies, Adverse Reactions, Alerts

          





                    Substance           Reaction            Event Type

 

                    Remicade            infections          Non Drug Allergy

 

                    NSAIDS              GI upset            Non Drug Allergy

 

                    Aleve               GI upset            Non Drug Allergy

 

                    Motrin              GI upset            Non Drug Allergy

 

                    Talwin              GI upset            Non Drug Allergy

 

                    Codeine             GI upset            Non Drug Allergy



                                                                                
                                                         



Problems

          





             Problem Type    Condition    Code         Onset Dates    Condition Status

 

             Assessment    Osteoporosis    M81.0                     Active

 

             Problem      Osteoporosis    M81.0                     Active

 

             Problem      Knee osteoarthritis    M17.9                     Active

 

             Problem      Vitamin D deficiency, unspecified    E55.9                     Active

 

                    Assessment          Rheumatoid arthritis without rheumatoid factor, multiple sites    M06.09

                                                    Active

 

             Assessment    Other long term (current) drug therapy    Z79.899                   Active

 

                    Problem             Rheumatoid arthritis without rheumatoid factor, multiple sites    M06.09 

                                                    Active

 

             Problem      Other long term (current) drug therapy    Z79.899                   Active



                                                                                
                                                                             



Medications

          





        Medication    Code System    Code    Instructions    Start Date    End Date    Status    Dosage



 

        PredniSONE    NDC     54682092714    1 MG Orally Once a day            2018    Active    

4 tablet

 

        Meloxicam    NDC     09916741147    7.5 MG Orally Once a day                    Active    1 tablet

 

        Methotrexate    NDC     83450161074    2.5 MG Orally once a week                    Active    take 10

 tablets

 

          Losartan Potassium-HCTZ    NDC       03150067736    100-12.5 MG Orally Once a day                        

Active                                  1 tablet

 

           Hydrocodone-Acetaminophen    NDC        59002104760     MG Orally every 6 hrs                 

                          Active                    1 tablet as needed

 

        Generlac    NDC     46529358886    10 GM/15ML Orally Once a day                    Active    15 ml

 

        Duloxetine HCl    NDC     69517579199    60 MG Orally Once a day                    Active    1 capsule



 

        Prolia    NDC     27251041016    60 MG/ML Subcutaneous     2017            Active    as 

directed

 

        Diltiazem HCl    NDC     80141788669    240 MG Orally Once a day                    Active    1 capsule

 on an empty stomach in the morning

 

        Ondansetron    NDC     90337551774    4 MG Orally every 8 hrs                    Active    1 tablet on

 the tongue and allow to dissolve

 

        Folic Acid    NDC     91270906224    1 MG                      Active    TAKE 1 TABLET BY MOUTH TWO  

TIMES DAILY

 

          Gentamicin Sulfate    NDC       40597848233    0.1 % Externally Three times a day                        

Active                                  1 application to affected area

 

        movantik    NDC     0       25mg oral daily                    Active    one tab

 

        Mirtazapine    NDC     79162988614    15 MG Orally Once a day                    Active    1 tablet before

 bedtime in the evening

 

        Xanax    NDC     78829129252    0.5 MG Orally Three times a day                    Active    1 tablet



 

        Clindamycin Phos & Cleanser    NDC     0       1 % Externally                     Active    as directed

 

                Metronidazole-Cleanser    NDC             [address]-87    0.75 % (Gel) Externally Twice a day 

                                                Active          1 application

 

        Temazepam    NDC     39321792133    30 MG Orally Once a day                    Active    1 capsule at

 bedtime as needed

 

        Methotrexate    NDC     0       2.5mg Orally Once a week    2017            Active    10 tablets



 

          Esomeprazole Magnesium    NDC       43705682259    40 MG Orally Once a day                        Active 

                                        1 capsule

 

           ProAir HFA    NDC        90699466513    108 (90 Base) MCG/ACT Inhalation every 4 hrs                

                          Active                    2 puffs as needed

 

        Atorvastatin Calcium    NDC     37133036377    40 MG Orally Once a day                    Active    1

 tablet



                                                                                
                                                                                
                                                                                
                                                                 



Vital Signs

          





                          Date/Time:                2018

 

                          BMI                       40.03 Index

 

                          Weight                    205 lbs

 

                          Height                    60 in

 

                          Temperature               98.3 F

 

                          Cardiac Monitoring Heart Rate    80 /min

 

                          Blood Pressure Diastolic    80 mm Hg

 

                          Blood Pressure Systolic    146 mm Hg



                                                                    



Results

          





           Name       Result     Date       Reference Range    Unit       Abnormality Flag

 

           COMPREHENSIVE METABOLIC PANEL W/EGFR                                                 

 

           ----CALCIUM    9.1        2018    8.6-10.4     mg/dL      N

 

           ----CARBON DIOXIDE    28         2018    20-31      mmol/L     N

 

           ----ALT    13         2018    6-29       U/L        N

 

           ----CREATININE    0.68       2018    0.50-0.99     mg/dL      N

 

           ----AST    15         2018    10-35      U/L        N

 

           ----eGFR NON-AFR. AMERICAN    91         2018    > OR=60     mL/min/1.73m2    N

 

           ----ALKALINE PHOSPHATASE    59         2018         U/L        N

 

           ----eGFR     105        2018    > OR=60     mL/min/1.73m2    N

 

           ----BILIRUBIN, TOTAL    0.5        2018    0.2-1.2     mg/dL      N

 

           ----BUN/CREATININE RATIO    NOT APPLICABLE    2018    6-22       (calc)      

 

           ----ALBUMIN/GLOBULIN RATIO    1.7        2018    1.0-2.5     (calc)     N

 

           ----SODIUM    139        2018    135-146     mmol/L     N

 

           ----GLOBULIN    2.4        2018    1.9-3.7     g/dL (calc)    N

 

           ----POTASSIUM    4.0        2018    3.5-5.3     mmol/L     N

 

           ----GLUCOSE    92         2018    65-99      mg/dL      N

 

           ----CHLORIDE    103        2018         mmol/L     N

 

           ----ALBUMIN    4.0        2018    3.6-5.1     g/dL       N

 

           ----UREA NITROGEN (BUN)    14         2018    7-25       mg/dL      N

 

           ----PROTEIN, TOTAL    6.4        2018    6.1-8.1     g/dL       N

 

           SED RATE BY MODIFIED WESTERGREN                                                 

 

           ----SED RATE BY MODIFIED WESTERGREN    9          2018    < OR=30     mm/h       N

 

           C-REACTIVE PROTEIN                                                 

 

           ----C-REACTIVE PROTEIN    4.9        34328814    <8.0       mg/L       N

 

           CBC (INCLUDES DIFF/PLT)                                                 

 

           ----MCHC    33.1       2018    32.0-36.0     g/dL       N

 

           ----MCH    29.4       2018    27.0-33.0     pg         N

 

           ----PLATELET COUNT    327        2018    140-400     Thousand/uL    N

 

           ----RDW    16.4       2018    11.0-15.0     %          H

 

           ----BASOPHILS    0.7        2018               %          N

 

           ----ABSOLUTE NEUTROPHILS    6527       2018    4935-1979     cells/uL    N

 

           ----ABSOLUTE LYMPHOCYTES    2293       2018    850-3900     cells/uL    N

 

           ----MPV    10.8       2018    7.5-12.5     fL         N

 

           ----ABSOLUTE BASOPHILS    69         2018    0-200      cells/uL    N

 

           ----HEMATOCRIT    32.0       [id number]    35.0-45.0     %          L

 

           ----NEUTROPHILS    66.6       2018               %          N

 

           ----MCV    88.6       2018    80.0-100.0     fL         N

 

           ----RED BLOOD CELL COUNT    3.61       2018    3.80-5.10     Million/uL    L

 

           ----ABSOLUTE MONOCYTES    804        2018    200-950     cells/uL    N

 

           ----ABSOLUTE EOSINOPHILS    108        2018         cells/uL    N

 

           ----HEMOGLOBIN    10.6       2018    11.7-15.5     g/dL       L

 

           ----EOSINOPHILS    1.1        2018               %          N

 

           ----WHITE BLOOD CELL COUNT    9.8        2018    3.8-10.8     Thousand/uL    N

 

           ----LYMPHOCYTES    23.4       2018               %          N

 

           ----MONOCYTES    8.2        [id number]               %          N



                                                                                
                 



Summary Purpose

          eClinicalWorks Submission

## 2018-11-28 NOTE — XMS REPORT
Wilbert Locke MD

                             Created on: 2017



Catie GREEN

External Reference #: [id number]

: 1950

Sex: Female



Demographics







                          Address                   [address], [place]  [id number]

 

                          Home Phone                [phone]

 

                          Preferred Language        Unknown

 

                          Marital Status            Unknown

 

                          Quaker Affiliation     Unknown

 

                          Race                      Unknown

 

                          Ethnic Group              Non-





Author







                          Author                    Justin Cat

 

                          [facility]              eClinicalWorks

 

                          Address                   Unknown

 

                          Phone                     Unavailable







Care Team Providers







                    Care Team Member Name    Role                Phone

 

                    Justin Cat       CP                  Unavailable



                                                                



Allergies, Adverse Reactions, Alerts

          





                    Substance           Reaction            Event Type

 

                    AMOXICILLIAN        rash                Non Drug Allergy

 

                    CODINE              rash                Non Drug Allergy



                                                                                
                 



Problems

          





             Problem Type    Condition    Code         Onset Dates    Condition Status

 

             Problem      Rheumatoid arthritis    714.0                     Active

 

                Problem         Encounter for long-term (current) use of other medications    Z79.899           

                                        Active

 

             Problem      Other constipation    K59.09                    Active

 

             Problem      Primary osteoarthritis of right knee    M17.11                    Active

 

             Assessment    Pain in left knee    M25.562                   Active

 

             Problem      Rheumatoid arthritis    M06.9                     Active

 

             Assessment    Drug induced constipation    K59.03                    Active

 

             Assessment    Left shoulder pain, unspecified chronicity    M25.512                   Active

 

             Problem      Opioid use with opioid-induced disorder    F11.99                    Active

 

             Problem      Pain in left knee    M25.562                   Active

 

             Problem      Pain in right knee    M25.561                   Active

 

             Problem      Pain in joint    M25.50                    Active

 

             Problem      Chronic pain syndrome    G89.4                     Active

 

             Assessment    Pain in right knee    M25.561                   Active

 

             Assessment    Rheumatoid arthritis    M06.9                     Active

 

             Assessment    Pain in joint    M25.50                    Active

 

                    Assessment          Encounter for long-term (current) use of other medications    Z79.899 

                                                    Active

 

             Problem      Chronic pain syndrome    338.4                     Active

 

             Problem      Other constipation    564.09                    Active

 

             Assessment    Chronic pain syndrome    G89.4                     Active

 

             Problem      Polyarthritis, multiple sites    716.59                    Active

 

             Assessment    Opioid use with opioid-induced disorder    F11.99                    Active

 

             Problem      Long-term (current) use of other medications - High Risk    V58.69                    Active



 

             Problem      Pain in joint, lower leg    719.46                    Active



                                                                                
                                                                                
                                                                                
                                                                           



Medications

          





        Medication    Code System    Code    Instructions    Start Date    End Date    Status    Dosage



 

        MethylPREDNISolone    NDC     [address]-50                            Active    not defined

 

        movantik    NDC     0       25mg oral daily        Active    one tab



 

        Doc-Q-Lace    NDC     [address]-75                            Active    not defined

 

        Methotrexate    NDC     0                               Active    not defined

 

        PredniSONE    NDC     98385727161    5 MG                      Active    TAKE 1 TABLET BY MOUTH ONCE 

DAILY WITH FOOD OR MILK

 

        Esomeprazole Magnesium    NDC     [address]-72                            Active    not defined

 

        Norco    NDC     [address]-69     MG Orally every 6 hrs                    Active    1 tablet 

as needed

 

        Methotrexate    NDC     13242334387    2.5 MG                      Active    Take 8 tablets by mouth 

 once weekly

 

        Diltiazem HCl    NDC     [address]-67                            Active    not defined

 

        Folic Acid    NDC     20073959052    1 MG                      Active    Take 1 tablet by mouth two  

times daily

 

        Amitiza    NDC     28696961404    24 MCG              2017    Active    Take 1 capsule by

 mouth  twice a day with food

 

        Atorvastatin Calcium    NDC     [address]-09                            Active    not defined

 

          Levofloxacin    NDC       [address]-19    500 MG Orally Once a day              2017    Active

                                        1 tablet

 

        Losartan    NDC     0                               Active    not defined

 

        Temazepam    NDC     [address]-10                            Active    not defined

 

           Doxycycline Hyclate    NDC        [address]-47    100 MG Orally every 12 hrs               2017                     Active                    1 capsule

 

        Norco    NDC     [address]-00     MG Orally every 6 hrs                    Active    1 tablet 

as needed

 

        Proctozone-HC    NDC     [address]-22                            Active    not defined

 

        Duloxetine HCl    NDC     [address]-93                            Active    not defined

 

        Fluconazole    NDC     [address]-69    100 MG Orally                     Active    1 tablet



                                                                                
                                                                                
                                                                                
                                             



Vital Signs

          





                          Date/Time:                2017

 

                          BMI                       41.55 Index

 

                          Weight                    219.9 lbs

 

                          Height                    61 in

 

                          Temperature               98.4 F

 

                          Cardiac Monitoring Heart Rate    80 /min

 

                          Blood Pressure Diastolic    74 mm Hg

 

                          Blood Pressure Systolic    128 mm Hg



                                                                              



Results

          No Known Results                                                      
             



Summary Purpose

          eClinicalWorks Submission

## 2018-11-28 NOTE — XMS REPORT
Wilbert Locke MD

                             Created on: 2017



Catie GREEN

External Reference #: [id number]

: 1950

Sex: Female



Demographics







                          Address                   [address], [place]  [id number]

 

                          Home Phone                [phone]

 

                          Preferred Language        Unknown

 

                          Marital Status            Unknown

 

                          Faith Affiliation     Unknown

 

                          Race                      Unknown

 

                          Ethnic Group              Non-





Author







                          Author                    Justin Cat

 

                          [facility]              [employer]

 

                          Address                   Unknown

 

                          Phone                     Unavailable







Care Team Providers







                    Care Team Member Name    Role                Phone

 

                    Justin Cat       CP                  Unavailable



                                                                



Allergies

          No Known Allergies                                                    
                                   



Problems

          





             Problem Type    Condition    Code         Onset Dates    Condition Status

 

             Problem      Rheumatoid arthritis    714.0                     Active

 

                Problem         Encounter for long-term (current) use of other medications    Z79.899           

                                        Active

 

             Problem      Other constipation    K59.09                    Active

 

             Problem      Primary osteoarthritis of right knee    M17.11                    Active

 

             Problem      Rheumatoid arthritis    M06.9                     Active

 

             Problem      Opioid use with opioid-induced disorder    F11.99                    Active

 

             Problem      Pain in left knee    M25.562                   Active

 

             Problem      Pain in right knee    M25.561                   Active

 

             Problem      Pain in joint    M25.50                    Active

 

             Problem      Chronic pain syndrome    G89.4                     Active

 

             Assessment    Opioid use with opioid-induced disorder    F11.99                    Active

 

             Problem      Chronic pain syndrome    338.4                     Active

 

             Problem      Other constipation    564.09                    Active

 

             Assessment    Other constipation    K59.09                    Active

 

             Problem      Polyarthritis, multiple sites    716.59                    Active

 

             Problem      Long-term (current) use of other medications - High Risk    V58.69                    Active



 

             Problem      Pain in joint, lower leg    719.46                    Active



                                                                                
                                                                                
                                                                                
     



Medications

          





        Medication    Code System    Code    Instructions    Start Date    End Date    Status    Dosage



 

        movantik    NDC     0       25mg oral daily                    Active    one tab

 

        Norco    NDC     [address]-70     MG Orally every 6 hrs                    Active    1 tablet 

as needed

 

        Duloxetine HCl    NDC     [address]-36                            Active    not defined

 

        Diltiazem HCl    NDC     [address]-72                            Active    not defined

 

           Movantik    NDC        [address]-30    25 MG Orally Once a day    Aug 21, 2017    Dec 19, 2017

                          Active                    1 tablet in the morning

 

        Atorvastatin Calcium    NDC     [address]-81                            Active    not defined

 

        Losartan    NDC     0                               Active    not defined

 

          Metronidazole    NDC       [address]-21    0.75 % Externally 1-2 times daily                        Active

                                        1 application to affected area

 

        Doc-Q-Lace    NDC     [address]-85                            Active    not defined

 

        Norco    NDC     [address]-20     MG Orally every 6 hrs                    Active    1 tablet 

as needed

 

                Clindamycin Phos-Benzoyl Perox    NDC             [address]-93    1-5 % Externally Once a day 

                                                Active          1 application to affected area

 

        Fluconazole    NDC     [address]-53    100 MG Orally                     Active    1 tablet

 

        Esomeprazole Magnesium    NDC     [address]-67                            Active    not defined

 

        Temazepam    NDC     [address]-10                            Active    not defined

 

        MethylPREDNISolone    NDC     [address]-42                            Active    not defined

 

        Folic Acid    NDC     76165965156    1 MG                      Active    Take 1 tablet by mouth two  

times daily

 

        PredniSONE    NDC     33831247103    5 MG                      Active    TAKE 1 TABLET BY MOUTH ONCE 

DAILY WITH FOOD OR MILK

 

        Methotrexate    NDC     0                               Active    not defined

 

        Proctozone-HC    NDC     [address]-52                            Active    not defined

 

        Methotrexate    NDC     97832391529    2.5 MG                      Active    Take 8 tablets by mouth 

 once weekly



                                                                                
                                                                                
                                                                                
                         



Results

          No Known Results                                                      
             



Summary Purpose

          [employer]Clinical[employer] Submission

## 2018-11-28 NOTE — XMS REPORT
Wilbert Locke MD

                             Created on: 2018



LIZY GREEN

External Reference #: [id number]

: 1950

Sex: Female



Demographics







                          Address                   [address] [place], [place]  [id number]

 

                          Home Phone                [phone]

 

                          Preferred Language        Unknown

 

                          Marital Status            Unknown

 

                          Uatsdin Affiliation     Unknown

 

                          Race                      Unknown

 

                          Ethnic Group              Non-





Author







                          Author                    Wilbert Locke

 

                          Organization              [employer]

 

                          Address                   Unknown

 

                          Phone                     Unavailable







Care Team Providers







                    Care Team Member Name    Role                Phone

 

                    Wilbert Locke     CP                  Unavailable



                                                                



Allergies

          No Known Allergies                                                    
                                   



Problems

          





             Problem Type    Condition    Code         Onset Dates    Condition Status

 

             Problem      Osteoporosis    M81.0                     Active

 

             Problem      Knee osteoarthritis    M17.9                     Active

 

             Problem      Vitamin D deficiency, unspecified    E55.9                     Active

 

                    Problem             Rheumatoid arthritis without rheumatoid factor, multiple sites    M06.09 

                                                    Active

 

             Problem      Other long term (current) drug therapy    Z79.899                   Active



                                                                                
                                               



Medications

          No Known Medications                                                  
                           



Results

          No Known Results                                                      
             



Summary Purpose

          [employer]Clinical[employer] Submission

## 2018-11-28 NOTE — XMS REPORT
Wilbert Locke MD

                             Created on: 05/10/2018



Catie GREEN

External Reference #: [id number]

: 1950

Sex: Female



Demographics







                          Address                   [address] [place], [place]  [address]

 

                          Home Phone                [phone]

 

                          Preferred Language        Unknown

 

                          Marital Status            Unknown

 

                          Amish Affiliation     Unknown

 

                          Race                      Unknown

 

                          Ethnic Group              Non-





Author







                          Author                    Justin Cat

 

                          Organization              [employer]

 

                          Address                   Unknown

 

                          Phone                     Unavailable







Care Team Providers







                    Care Team Member Name    Role                Phone

 

                    Justin Cat       CP                  Unavailable



                                                                



Allergies

          No Known Allergies                                                    
                                   



Problems

          





             Problem Type    Condition    Code         Onset Dates    Condition Status

 

             Problem      Other constipation    K59.09                    Active

 

                Problem         Encounter for long-term (current) use of other medications    Z79.899           

                                        Active

 

             Problem      Primary osteoarthritis of right knee    M17.11                    Active

 

             Problem      Pain in joint    M25.50                    Active

 

             Problem      Opioid use with opioid-induced disorder    F11.99                    Active

 

             Problem      Pain in left knee    M25.562                   Active

 

             Problem      Chronic pain syndrome    G89.4                     Active

 

             Problem      Rheumatoid arthritis    M06.9                     Active

 

             Problem      Pain in right knee    M25.561                   Active



                                                                                
                                                                                
      



Medications

          No Known Medications                                                  
                           



Results

          No Known Results                                                      
             



Summary Purpose

          [employer]ClinicalWorks Submission

## 2018-11-28 NOTE — XMS REPORT
Wilbert Locke MD

                             Created on: 2018



LIZY GREEN

External Reference #: [id number]

: 1950

Sex: Female



Demographics







                          Address                   [address] [place], [place]  [id number]

 

                          Home Phone                [phone]

 

                          Preferred Language        Unknown

 

                          Marital Status            Unknown

 

                          Yazdanism Affiliation     Unknown

 

                          Race                      Unknown

 

                          Ethnic Group              Non-





Author







                          Author                    Emperatriz Adam

 

                          [facility]              [employer]

 

                          Address                   Unknown

 

                          Phone                     Unavailable







Care Team Providers







                    Care Team Member Name    Role                Phone

 

                    Emperatriz Adam                      Unavailable



                                                                



Allergies, Adverse Reactions, Alerts

          





                    Substance           Reaction            Event Type

 

                    Remicade            infections          Non Drug Allergy

 

                    NSAIDS              GI upset            Non Drug Allergy

 

                    Aleve               GI upset            Non Drug Allergy

 

                    Motrin              GI upset            Non Drug Allergy

 

                    Talwin              GI upset            Non Drug Allergy

 

                    Codeine             GI upset            Non Drug Allergy



                                                                                
                                                         



Problems

          





             Problem Type    Condition    Code         Onset Dates    Condition Status

 

                    Assessment          Rheumatoid arthritis without rheumatoid factor, multiple sites    M06.09

                                                    Active

 

             Assessment    Other long term (current) drug therapy    Z79.899                   Active

 

             Problem      Osteoporosis    M81.0                     Active

 

             Problem      Knee osteoarthritis    M17.9                     Active

 

             Problem      Vitamin D deficiency, unspecified    E55.9                     Active

 

             Assessment    Osteoporosis    M81.0                     Active

 

             Assessment    Vitamin D deficiency, unspecified    E55.9                     Active

 

                    Problem             Rheumatoid arthritis without rheumatoid factor, multiple sites    M06.09 

                                                    Active

 

             Problem      Other long term (current) drug therapy    Z79.899                   Active



                                                                                
                                                                                
      



Medications

          





        Medication    Code System    Code    Instructions    Start Date    End Date    Status    Dosage



 

          Esomeprazole Magnesium    NDC       57637646058    40 MG Orally Once a day                        Active 

                                        1 capsule

 

        Duloxetine HCl    NDC     52587600804    60 MG Orally Once a day                    Active    1 capsule



 

          Gentamicin Sulfate    NDC       79906184960    0.1 % Externally Three times a day                        

Active                                  1 application to affected area

 

        Methotrexate    NDC     12870258931    2.5 MG Orally once a week                    Active    take 10

 tablets

 

        movantik    NDC     0       25mg oral daily                    Active    one tab

 

          Losartan Potassium-HCTZ    NDC       36814002740    100-12.5 MG Orally Once a day                        

Active                                  1 tablet

 

        Mirtazapine    NDC     49330135217    15 MG Orally Once a day                    Active    1 tablet before

 bedtime in the evening

 

        Xanax    NDC     46349910740    0.5 MG Orally Three times a day                    Active    1 tablet



 

        PredniSONE    NDC     38496653928    1 MG Orally Once a day                    Active    4 tablet

 

        Prolia    ND     71970919101    60 MG/ML Subcutaneous                     Active    as directed

 

        Atorvastatin Calcium    NDC     18650818793    40 MG Orally Once a day                    Active    1

 tablet

 

        Meloxicam    NDC     04486456648    7.5 MG Orally Once a day                    Active    1 tablet

 

        Temazepam    NDC     92510295183    30 MG Orally Once a day                    Active    1 capsule at

 bedtime as needed

 

           ProAir HFA    NDC        48729396972    108 (90 Base) MCG/ACT Inhalation every 4 hrs                

                          Active                    2 puffs as needed

 

           Hydrocodone-Acetaminophen    NDC        22522766541     MG Orally every 6 hrs                 

                          Active                    1 tablet as needed

 

        Folic Acid    NDC     55923373237    1 MG                      Active    TAKE 1 TABLET BY MOUTH TWO  

TIMES DAILY



                                                                                
                                                                                
                                                                                
     



Vital Signs

          





                          Date/Time:                2018

 

                          BMI                       40.39 Index

 

                          Weight                    200 lbs

 

                          Height                    59 in

 

                          Temperature               99.3 F

 

                          Cardiac Monitoring Heart Rate    72 /min

 

                          Blood Pressure Diastolic    80 mm Hg

 

                          Blood Pressure Systolic    150 mm Hg



                                                                              



Results

          No Known Results                                                      
             



Summary Purpose

          [employer]ClinicalWorks Submission

## 2018-11-28 NOTE — XMS REPORT
Patient Summary Document

                             Created on: 2018



LIZY GREEN

External Reference #: [id number]

: 1950

Sex: Female



Demographics







                          Address                   [address]

[place]  [address]

 

                          Home Phone                [phone]

 

                          Preferred Language        Unknown

 

                          Marital Status            Unknown

 

                          Orthodox Affiliation     Unknown

 

                          Race                      Unknown

 

                          Ethnic Group              Unknown





Author







                          Author                    [facility]

 

                          [facility]

 

                          Address                   Unknown

 

                          Phone                     Unavailable







Support







                Name            Relationship    Address         Phone

 

                    AJAY MAXWELL      PRS                 [address] [place], [place]  [address] [phone]

 

                    HANSAJAY HANLEY      PRS                 [address] [place], [place]  [address] [phone]

 

                    JANE GREEN    PRS                 [address] [place], [place]  [id number] [phone]

 

                    NANCY MAXWELL    PRS                 [place], [place]  [address] [phone]







Care Team Providers







                    Care Team Member Name    Role                Phone

 

                          Unavailable               Unavailable







Payers







             Payer Name    Policy Type    Policy Number    Effective Date    Expiration Date







Problems

This patient has no known problems.



Allergies, Adverse Reactions, Alerts







          Allergy Name    Allergy Type    Status    Severity    Reaction(s)    Onset Date    Inactive 

Date                      Treating Clinician        Comments

 

        codeine    DA      Active    MO              2018 00:00:00                     

 

        prednisone    DA      Active    MO              2018 00:00:00                     

 

        amoxicillin    DA      Active    MO              2018 00:00:00                     

 

        codeine    DA      Active    SV              2017 00:00:00                     

 

        pentazocine    DA      Active    SV              2017 00:00:00                     







Medications

This patient has no known medications.

## 2018-11-28 NOTE — XMS REPORT
Wilbert Locke MD

                             Created on: 2017



Catie GREEN

External Reference #: [id number]

: 1950

Sex: Female



Demographics







                          Address                   [address] [place], [place]  [id number]

 

                          Home Phone                [phone]

 

                          Preferred Language        Unknown

 

                          Marital Status            Unknown

 

                          Zoroastrianism Affiliation     Unknown

 

                          Race                      Unknown

 

                          Ethnic Group              Non-





Author







                          Author                    Justin Cat

 

                          [facility]              [employer]Works

 

                          Address                   Unknown

 

                          Phone                     Unavailable







Care Team Providers







                    Care Team Member Name    Role                Phone

 

                    Justin aCt       CP                  Unavailable



                                                                



Allergies, Adverse Reactions, Alerts

          





                    Substance           Reaction            Event Type

 

                    AMOXICILLIAN        rash                Non Drug Allergy

 

                    CODINE              rash                Non Drug Allergy



                                                                                
                 



Problems

          





             Problem Type    Condition    Code         Onset Dates    Condition Status

 

             Problem      Rheumatoid arthritis    714.0                     Active

 

                Problem         Encounter for long-term (current) use of other medications    Z79.899           

                                        Active

 

             Problem      Other constipation    K59.09                    Active

 

             Problem      Primary osteoarthritis of right knee    M17.11                    Active

 

             Assessment    Pain in left knee    M25.562                   Active

 

             Problem      Rheumatoid arthritis    M06.9                     Active

 

             Assessment    Drug induced constipation    K59.03                    Active

 

             Assessment    Left shoulder pain, unspecified chronicity    M25.512                   Active

 

             Problem      Opioid use with opioid-induced disorder    F11.99                    Active

 

             Problem      Pain in left knee    M25.562                   Active

 

             Problem      Pain in right knee    M25.561                   Active

 

             Problem      Pain in joint    M25.50                    Active

 

             Problem      Chronic pain syndrome    G89.4                     Active

 

             Assessment    Pain in right knee    M25.561                   Active

 

             Assessment    Rheumatoid arthritis    M06.9                     Active

 

             Assessment    Pain in joint    M25.50                    Active

 

                    Assessment          Encounter for long-term (current) use of other medications    Z79.899 

                                                    Active

 

             Problem      Chronic pain syndrome    338.4                     Active

 

             Problem      Other constipation    564.09                    Active

 

             Assessment    Chronic pain syndrome    G89.4                     Active

 

             Problem      Polyarthritis, multiple sites    716.59                    Active

 

             Assessment    Opioid use with opioid-induced disorder    F11.99                    Active

 

             Problem      Long-term (current) use of other medications - High Risk    V58.69                    Active



 

             Problem      Pain in joint, lower leg    719.46                    Active



                                                                                
                                                                                
                                                                                
                                                                           



Medications

          





        Medication    Code System    Code    Instructions    Start Date    End Date    Status    Dosage



 

        Methotrexate    NDC     0                               Active    not defined

 

        Duloxetine HCl    NDC     [address]-40                            Active    not defined

 

        Diltiazem HCl    NDC     [address]-39                            Active    not defined

 

        Esomeprazole Magnesium    NDC     [address]-02                            Active    not defined

 

        movantik    NDC     0       25mg oral daily                    Active    one tab

 

                Clindamycin Phos-Benzoyl Perox    NDC             [address]-86    1-5 % Externally Once a day 

                                                Active          1 application to affected area

 

        MethylPREDNISolone    NDC     [address]-61                            Active    not defined

 

        Norco    NDC     [address]-64     MG Orally every 6 hrs                    Active    1 tablet 

as needed

 

        Atorvastatin Calcium    NDC     [address]-81                            Active    not defined

 

        Losartan    NDC     0                               Active    not defined

 

        Temazepam    NDC     [address]-10                            Active    not defined

 

          Metronidazole    NDC       [address]-17    0.75 % Externally 1-2 times daily                        Active

                                        1 application to affected area

 

           Movantik    NDC        [address]-63    25 MG Orally Once a day    Aug 21, 2017    Dec 19, 2017

                          Active                    1 tablet in the morning

 

        Proctozone-HC    NDC     [address]-43                            Active    not defined

 

        Doc-Q-Lace    NDC     [address]-18                            Active    not defined

 

        PredniSONE    NDC     74889297020    5 MG                      Active    TAKE 1 TABLET BY MOUTH ONCE 

DAILY WITH FOOD OR MILK

 

        Norco    NDC     [address]-78     MG Orally every 6 hrs                    Active    1 tablet 

as needed

 

        Folic Acid    NDC     19392016168    1 MG                      Active    Take 1 tablet by mouth two  

times daily

 

        Fluconazole    NDC     [address]-06    100 MG Orally                     Active    1 tablet

 

        Methotrexate    NDC     02827942562    2.5 MG                      Active    Take 8 tablets by mouth 

 once weekly



                                                                                
                                                                                
                                                                                
                         



Results

          No Known Results                                                      
             



Summary Purpose

          eClinicalWorks Submission

## 2018-11-28 NOTE — DIAGNOSTIC IMAGING REPORT
CT angiogram of the chest, abdomen and pelvis.



CPT code number: [id number]



History: Midthoracic back pain and chest pain when bending forward.



Technique: Multidetector helical CT angiography was carried out from the

thoracic inlet to the lesser trochanters before and following intravenous

contrast administration.  Thin section image collimation was utilized and 3-D

images were postprocessed on a separate workstation.  Parenchymal organ imaging

will be limited by arterial phase of contrast administration.



Dose reduction techniques used: Automated exposure control, adjustment of the

mAs and/or kVp according to patient size, standardized low-dose protocol,

and/or iterative reconstruction technique.



RADIATION DOSE:

     Total DLP: 1255.3 mGy*cm    

     Estimated effective dose: (DLP x 0.015 x size factor) mSv

     CTDIvol has been reviewed. It is below the limits set by the Radiation

Protocol Committee (RPC).



Comparison: CT abdomen/pelvis 12/27/2016, CT abdomen/pelvis 11/26/2015



CT scan thorax:



Thyroid/base of neck:  Low attenuating lesion the left lobe measures 5 mm. A

nodule in the right lobe measures 1.9 x 1.3 cm.  The remainder of the lower

neck is unremarkable.



Pleura: No pleural effusion or pleural based mass.



Heart: Mild cardiomegaly with left ventricular hypertrophy. No pericardial

effusion. Mild calcifications of the mitral valve.



Lymph nodes: No enlarged axillary, supraclavicular, mediastinal, or hilar lymph

nodes..



Pulmonary vessels: Main pulmonary artery measures 3.0 cm. No filling defects.



Pulmonary parenchyma: 



Right lung: Diffusely hyperinflated. No infiltrate or mass. Wispy bands of

subsegmental atelectasis in the middle and lower lobes.



Left lung: Diffusely hyperinflated. No mass or infiltrate.



CT scan of the abdomen:



Liver: Normal in attenuation without mass.



Spleen: Normal in attenuation and size without mass.



Pancreas: Mild fatty atrophy. No mass or ductal dilatation.



Adrenal glands: No evidence for mass..



Gallbladder: Absent. 



Biliary tree: The intrahepatic and intrahepatic bile ducts are diffusely

distended. The common bile duct measures 12 mm in diameter and tapers as it

approaches the ampulla. No intraluminal filling defects to suggest

choledocholithiasis.



Retroperitoneum: No retroperitoneal hemorrhage.



Kidneys: Punctate calculus in the upper pole of the right kidney. Symmetric

enhancement of the kidneys. No hydronephrosis. Low attenuating lesion in the

posterior upper pole of the right kidney measures 0.9 x 1.0 cm.



Bowel and mesentery: 



Esophagus: Patulous with dependently layering fluid in the lower esophagus.

There is narrowing of the esophagus as the descending thoracic aorta crosses

midline.



Stomach: Collapsed.

Small bowel: Small bowel loops are distended to 2.8 cm. Several small bowel

loops contain fluid. A large ventral abdominal wall hernia is redemonstrated

containing multiple loops of small and large bowel. No evidence of mesenteric

edema.



Large bowel: The majority of the large bowel including the cecum and appendix

are in the hernia. Diverticulosis coli is present without associated

inflammation. No large bowel dilatation.



CT scan pelvis:



Urinary bladder: Well distended and is normal. No ureteral dilatation.



Lymph nodes: No evidence of lymphadenopathy.



The uterus is present and normal in morphology. No adnexal mass.



No free fluid or fluid collection.



Soft tissues: Diastases of the rectus abdominis is redemonstrated with hernia

as described above.



Bones: Diffusely demineralized. Moderate degenerative changes of the spine.

There is stable grade 1 anterolisthesis of L4 on L5 without pars defects. There

is a treated compression fracture of a midthoracic vertebral body, likely T6

with vertebral plana and protrusion of the posterior cortex into the spinal

canal. There are no additional compression deformities. There are moderate

generative changes of the left shoulder.



CT angiography:



1. Aortic sinus: 3.4 cm.

2. Sinotubular junction: 3.0 cm

3. Proximal ascending aorta:3.8 x 3.8 cm

4. Proximal aortic arch:3.5 cm

5. Mid aortic arch:2.9 cm

6. Distal aortic arch:2.7 x 3.0 cm

7. Descending thoracic aorta:2.9 cm

8. Aorta at the diaphragm:2.7 cm

9. Aorta at the celiac artery:2.6 cm

10. Aorta at the cranial most renal artery:2.1 x 2.2 cm

11.  Aorta at the caudal-most renal artery:2.0 x 2.3 cm

12. Infrarenal aorta: 2.0 x 2.0 cm

13.  Bifurcation:1.7 x 1.9 cm

14.  Morphology:The thoracoabdominal aorta is markedly ectatic. The descending

thoracic aorta crosses to the right at the left atrium and then turns back to

midline at the diaphragmatic hiatus. There is no evidence of mural hematoma or

displaced mural calcification in the thoracic or abdominal aorta. No periaortic

inflammation. No evidence of dissection or penetrating ulcer.



There is ectasia of the great vessels. The left vertebral artery arises

directed from the aortic arch. No evidence of stenosis or dissection of the

great vessel origins.



Celiac artery: Mildly narrowed at the origin to 4 mm with dilatation to 9 mm at

the bifurcation of the hepatic and splenic arteries. Branching of the celiac

artery is conventional.



SMA: Origin measures 8 mm. No stenosis.



Renal arteries: 2 arteries supply the right kidney. No significant stenosis at

the origins of the main renal arteries.



KING: Patent.

Iliac arteries: Right common iliac artery measures 1.3 cm. Left common iliac

artery measures 1.2 cm. There is ectasia of the internal and external iliac

arteries.

Femoral arteries: Right femoral artery measures 1.2 cm. Left femoral artery

measures 0.9 cm.



IMPRESSION:



1.  No evidence of aortic dissection or mural hematoma. No retroperitoneal

hemorrhage.

2.  Markedly ectatic thoracoabdominal aorta as well as ectasia of the great

vessels of the neck and iliac arteries. Top normal size of the ascending aorta.

No significant burden of atherosclerotic plaque. No significant stenoses of the

visceral vasculature of the abdomen. Aneurysmal dilatation of the distal celiac

artery as described above. No associated thrombus.

3. Prominent main pulmonary artery suggestive of pulmonary artery hypertension.

No evidence of pulmonary embolus

4.  Treated compression fracture of the midthoracic spine with retropulsed

fragments into the spinal canal. No new or untreated compression deformities.

Stable grade 1 anterolisthesis of L4 on L5.

5. COPD.

6. Patulousness of the midesophagus containing a small amount of fluid. This is

likely due to extrinsic compression from the ectatic descending thoracic aorta.

7. Cholecystectomy with intrahepatic and intrahepatic biliary ductal dilatation

secondary to reservoir effect and ampullary stenosis. No evidence of

choledocholithiasis.

8.  Large ventral abdominal wall hernia containing small and large bowel.

Mildly prominent small bowel loops are suggestive of ileus or very low-grade

partial small bowel obstruction. No fluid in the hernia sac.

9. Diverticulosis coli. No evidence for bowel obstruction or inflammation.

10. Low attenuating lesion in the right kidney is likely a cyst. This can be

confirmed with ultrasound on an outpatient basis.

11. Thyroid nodules as described above. Recommend evaluation of the thyroid

gland with ultrasound.





Signed by: Dr. Tena Mi MD on 11/28/2018 5:31 PM

## 2018-11-28 NOTE — XMS REPORT
Wilbert Locke MD

                             Created on: 2017



Catie GREEN

External Reference #: [id number]

: 1950

Sex: Female



Demographics







                          Address                   [address] [place], [place]  [id number]

 

                          Home Phone                [phone]

 

                          Preferred Language        Unknown

 

                          Marital Status            Unknown

 

                          Muslim Affiliation     Unknown

 

                          Race                      Unknown

 

                          Ethnic Group              Non-





Author







                          Author                    Justin Cat

 

                          Organization              [employer]

 

                          Address                   Unknown

 

                          Phone                     Unavailable







Care Team Providers







                    Care Team Member Name    Role                Phone

 

                    Justin Cat       CP                  Unavailable



                                                                



Allergies

          No Known Allergies                                                    
                                   



Problems

          





             Problem Type    Condition    Code         Onset Dates    Condition Status

 

             Problem      Pain in joint, lower leg    719.46                    Active

 

             Problem      Other constipation    K59.09                    Active

 

             Problem      Rheumatoid arthritis    714.0                     Active

 

             Problem      Rheumatoid arthritis    M06.9                     Active

 

             Problem      Pain in joint    M25.50                    Active

 

             Problem      Primary osteoarthritis of right knee    M17.11                    Active

 

             Problem      Pain in right knee    M25.561                   Active

 

                Problem         Encounter for long-term (current) use of other medications    Z79.899           

                                        Active

 

             Problem      Chronic pain syndrome    G89.4                     Active

 

             Problem      Pain in left knee    M25.562                   Active

 

             Problem      Long-term (current) use of other medications - High Risk    V58.69                    Active



 

             Problem      Chronic pain syndrome    338.4                     Active

 

             Problem      Other constipation    564.09                    Active

 

             Problem      Polyarthritis, multiple sites    716.59                    Active



                                                                                
                                                                                
                                                        



Medications

          No Known Medications                                                  
                           



Results

          No Known Results                                                      
             



Summary Purpose

          [employer]ClinicalWorks Submission

## 2018-11-29 VITALS — SYSTOLIC BLOOD PRESSURE: 182 MMHG | DIASTOLIC BLOOD PRESSURE: 74 MMHG

## 2018-11-29 VITALS — DIASTOLIC BLOOD PRESSURE: 65 MMHG | SYSTOLIC BLOOD PRESSURE: 138 MMHG

## 2018-11-29 VITALS — DIASTOLIC BLOOD PRESSURE: 59 MMHG | SYSTOLIC BLOOD PRESSURE: 123 MMHG

## 2018-11-29 VITALS — DIASTOLIC BLOOD PRESSURE: 60 MMHG | SYSTOLIC BLOOD PRESSURE: 135 MMHG

## 2018-11-29 VITALS — SYSTOLIC BLOOD PRESSURE: 116 MMHG | DIASTOLIC BLOOD PRESSURE: 57 MMHG

## 2018-11-29 VITALS — SYSTOLIC BLOOD PRESSURE: 110 MMHG | DIASTOLIC BLOOD PRESSURE: 70 MMHG

## 2018-11-29 RX ADMIN — ALBUTEROL SULFATE SCH GM: 90 AEROSOL, METERED RESPIRATORY (INHALATION) at 21:02

## 2018-11-29 RX ADMIN — Medication SCH MG: at 17:33

## 2018-11-29 RX ADMIN — Medication SCH MEQ: at 10:42

## 2018-11-29 RX ADMIN — BISACODYL ONE MG: 10 SUPPOSITORY RECTAL at 02:59

## 2018-11-29 RX ADMIN — ALBUTEROL SULFATE SCH GM: 90 AEROSOL, METERED RESPIRATORY (INHALATION) at 11:18

## 2018-11-29 RX ADMIN — BISACODYL ONE MG: 10 SUPPOSITORY RECTAL at 10:48

## 2018-11-29 RX ADMIN — SODIUM CHLORIDE SCH MLS/HR: 9 INJECTION, SOLUTION INTRAVENOUS at 05:06

## 2018-11-29 RX ADMIN — ALBUTEROL SULFATE SCH GM: 90 AEROSOL, METERED RESPIRATORY (INHALATION) at 19:00

## 2018-11-29 RX ADMIN — DULOXETINE HYDROCHLORIDE SCH MG: 30 CAPSULE, DELAYED RELEASE ORAL at 17:33

## 2018-11-29 RX ADMIN — ALPRAZOLAM SCH MG: 0.5 TABLET ORAL at 20:29

## 2018-11-29 RX ADMIN — PANTOPRAZOLE SODIUM SCH MG: 40 TABLET, DELAYED RELEASE ORAL at 10:48

## 2018-11-29 RX ADMIN — MIRTAZAPINE SCH MG: 15 TABLET, FILM COATED ORAL at 20:29

## 2018-11-29 RX ADMIN — APIXABAN SCH MG: 5 TABLET, FILM COATED ORAL at 20:29

## 2018-11-29 RX ADMIN — TEMAZEPAM PRN MG: 15 CAPSULE ORAL at 21:15

## 2018-11-29 RX ADMIN — Medication SCH MG: at 20:29

## 2018-11-29 RX ADMIN — DILTIAZEM HYDROCHLORIDE SCH MG: 120 CAPSULE, COATED, EXTENDED RELEASE ORAL at 10:43

## 2018-11-29 RX ADMIN — Medication SCH EA: at 10:48

## 2018-11-29 RX ADMIN — SODIUM CHLORIDE SCH GM: 9 INJECTION, SOLUTION INTRAVENOUS at 10:43

## 2018-11-29 RX ADMIN — ALPRAZOLAM SCH MG: 0.5 TABLET ORAL at 10:43

## 2018-11-29 RX ADMIN — ALPRAZOLAM SCH MG: 0.5 TABLET ORAL at 17:33

## 2018-11-29 RX ADMIN — SODIUM CHLORIDE SCH MLS/HR: 9 INJECTION, SOLUTION INTRAVENOUS at 13:45

## 2018-11-29 RX ADMIN — SODIUM CHLORIDE SCH MLS/HR: 9 INJECTION, SOLUTION INTRAVENOUS at 23:45

## 2018-11-29 RX ADMIN — Medication SCH MG: at 10:47

## 2018-11-29 RX ADMIN — Medication SCH MG: at 10:42

## 2018-11-29 RX ADMIN — DULOXETINE HYDROCHLORIDE SCH MG: 30 CAPSULE, DELAYED RELEASE ORAL at 10:42

## 2018-11-29 RX ADMIN — PREDNISONE SCH MG: 5 TABLET ORAL at 10:48

## 2018-11-29 NOTE — DIAGNOSTIC IMAGING REPORT
EXAM:  ABDOMEN-1VIEW (KUB), supine

INDICATION: Ileus

COMPARISON: CT of the abdomen November 28, 2018 and abdominal x-ray December 28, 2016



FINDINGS:

LINES/TUBES: None



BOWEL PATTERN: No evidence for obstruction.



SOFT TISSUES: Surgical clips right upper quadrant of the abdomen.



LUNG BASES: No consolidations.



BONES: No acute findings.



IMPRESSION:

No evidence of ileus











Signed by: Dr. Zandra Gutierrez M.D. on 11/29/2018 6:48 AM

## 2018-11-29 NOTE — HISTORY AND PHYSICAL
PCP:  Elvira Boyd MD  



CHIEF COMPLAINTS:  Mid back pain and abdominal pain with abnormal CT scan 

of abdomen and pelvis.



HISTORY OF PRESENT ILLNESS:  The patient is a 68-year-old female with 

abdominal and mid-back and lower back pain.  Patient came to the emergency 

room with pain.  The patient had multiple workup done.  First, she had a 

CTA of the chest, and the findings on the CTA of the chest showed COPD, 

prominent pulmonary artery, and hypertension.  Along with that, she also 

had multiple other findings.  She has a markedly ectatic thoracoabdominal 

aorta as well as ectasis of great vessels of the neck and the iliac 

arteries.  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  

This is a large ventral abdominal wall hernia containing small and large 

bowel, prominent small-bowel loop suggestive of ileus or very low-grade 

partial small-bowel obstruction as well on the CT scan of the abdomen and 

pelvis.  Patient with some nausea and vomiting but much improved.  The 

patient is otherwise stable at this time.



PAST SURGICAL HISTORY:  Includes multiple abdominal surgeries.  She had a 

 multiple times and cholecystectomy.



PAST MEDICAL HISTORY:  Osteoarthritis, hypertension, COPD, reflux, 

insomnia, anxiety disorder, rheumatoid arthritis, recurrent urinary tract 

infection, obesity, overactive urinary bladder, dyslipidemia.

 

SOCIAL HISTORY:  Patient does not smoke or use alcohol.  No recreational 

drugs.



ALLERGIES:  CIPRO, AMOXICILLIN, CODEINE AND PENTAZOCINE.



HOME MEDICATIONS:  List was extensively reviewed.  Albuterol, Xanax, 

Lipitor, Celebrex, diltiazem, doxycycline, Cymbalta, Nexium, fentanyl 

patch, fluconazole, folic acid, Norco, losartan/hydrochlorothiazide, 

methotrexate, mirtazapine, omeprazole, Zofran, potassium, prednisone and 

temazepam.



PHYSICAL EXAMINATION

GENERAL:  The patient is in no acute distress.  She is awake. 

VITAL SIGNS:  Temperature is 98.  Blood pressure 110/70.  Pulse rate is 88, 

respirations 18. 

HEENT:  Normocephalic, atraumatic, anicteric. 

NECK:  Supple grossly.  

PULMONARY:  Diminished breath sounds bilaterally with some rhonchi. 

CARDIOVASCULAR:  S1 and S2.  Regular rate and rhythm. 

ABDOMEN:  Soft.  Ventral hernia.  It is soft and reducible, some slight 

tenderness. 

EXTREMITIES:  No cyanosis or edema. 

NEUROLOGIC:  No focal deficit. 



LABORATORY:  Sodium is 123, potassium 3.0, chloride 87, bicarb 24, BUN 16, 

creatinine 0.7.  Glucose 89.



WBC is 12.9, hemoglobin 11.9, hematocrit 33.8, platelets 474. 



CT scan of abdomen and pelvis and CTA of the chest and abdomen are reviewed 

above.



IMPRESSION  

1. Abdominal pain associated with ileus and nausea and vomiting, 

improving.

2. Abdominal ventral hernia, reducible. 

3. Hyponatremia and low potassium along with hypokalemia, most likely 

secondary from diuretic usage, hydrochlorothiazide and over-drinking of 

water. 

4. Multiple chronic baseline problems.



PLAN:  Consultation with Dr. Nichols and Dr. Chaitanya Hernández.  Continue with 

current medications for now.  Consultation with Dr. Murphy for the CTA 

result.  Continue with home medications 

with some adjustments.  Normal saline for now.  Will check a urinalysis 

since the patient had moderate bacteria previously and also with Lee 

catheter in place.



    



DD:  2018 08:55

DT:  2018 10:04

Job#:  [id number]

## 2018-11-29 NOTE — CONSULTATION
DATE OF CONSULTATION:  2018 



SURGICAL CONSULTATION 



CHIEF COMPLAINT:  Neck pain.  



HISTORY OF PRESENT ILLNESS:  Patient is a 68-year-old female admitted for 

neck pain and upper back pain.  She gave a history of prior thoracic spine 

surgery with injection of the cement.  The patient states the pain has 

improved since admission.  The patient had a workup including CT of the 

chest and abdomen, revealed no acute spinal process.  However, the 

abdominal CT revealed a large lower abdominal hernia with small and large 

bowel loops in the hernia with partial evidence of bowel obstruction.



PAST MEDICAL HISTORY:  Positive for hypertension, anxiety disorder, 

hyperlipidemia.



SURGICAL HISTORY:  Positive for cholecystectomy, , thoracic spine 

surgery.



ALLERGIES:  SHE IS ALLERGIC TO CODEINE, PENICILLIN AND CIPRO.



SOCIAL HABITS:  No smoking or alcohol abuse.



REVIEW OF SYSTEMS:  As mentioned.  No chest pain.  Neck pain has improved.  

No abdominal pain or vomiting.  No constipation, no diarrhea.

 

PHYSICAL EXAMINATION:  

VITALS:  Stable.  She is afebrile. 

GENERAL:  The patient is awake, alert, in no apparent distress. 

HEENT:  Sclerae are anicteric. 

NECK:  Supple. 

LUNGS:  Clear. 

HEART:  Regular rate and rhythm.  

ABDOMEN:  Soft.

EXTREMITIES:  No cyanosis, edema.  



White cell count is 12.  Hemoglobin of 12.  Creatinine of 0.7. 



CT of the abdomen, as mentioned, showed a large hernia without 

incarceration or obstruction. 



ASSESSMENT:  Patient with chronic lower abdominal incisional hernia with 

small and large loops of bowel without obstruction.  The patient is not 

interested in repairing the hernia at the present time.  Diet as tolerated. 

 Patient can be followed as an outpatient.  



 





DD:  2018 10:02

DT:  2018 12:20

Job#:  [id number]

## 2018-11-29 NOTE — CONSULTATION
DATE OF CONSULTATION:  November 29, 2018 



REFERRING PHYSICIAN:   Dr. All Hannon.



REASON FOR CONSULTATION:  Abnormal CT findings in aorta.  



HISTORY OF PRESENT ILLNESS:  Ms. Catie West is a 68-year-old woman with a 

history of rheumatoid arthritis, hypertension, COPD, anxiety and history of 

recurrent UTIs and obesity as well as dyslipidemia who presents to [facility] with back discomfort and abdominal 

discomfort.  She has a ventral hernia for which surgery has been consulted. 

 The patient so far seemingly declining surgical intervention at this point 

in time.  Her back pain has improved with ____ patch applied topically.  

Studies included a CTA of chest, abdomen and pelvis, which was significant 

for aortic ectasia with no evidence of dissection or mural hematoma and no 

retroperitoneal hemorrhage.  The thoracoabdominal aorta particularly is 

very ectatic as well as the great vessels of the neck and iliac arteries.  

_____ at the top normal size for ascending aorta size.  There was no 

significant bloating or sclerotic plaque.  An original dilatation of the 

distal celiac artery, which seems to be mildly narrowed at the origin at 4 

mm and dilatation to 10 mm at the bifurcation of hepatic and splenic 

arteries with normal celiac artery branching.  The SMA measures on the 

other hand 80 mm.  She also has incidental finding on EKG to have atrial 

fibrillation.  She denies any chest pain or shortness of breath.  There are 

no other current complaints.  



PAST MEDICAL HISTORY:  As per HPI.  



ALLERGIES:  PER EMR, CIPROFLOXACIN.  



SOCIAL HISTORY:  Denies smoking, alcohol or drugs.  



FAMILY HISTORY:  Noncontributory. 



PHYSICAL EXAMINATION

VITALS:   Temperature 98.4, heart rate 85, respiratory rate 20, blood 

pressure 122/59, O2 sat 96% on room air. 

GENERAL:   In no acute distress.  Alert. 

NECK:   No JVD or carotid bruit. 

CHEST:   Clear to auscultation.

CARDIOVASCULAR:  Irregular rate and rhythm.  Normal S1 and S2.  No S3 or 

S4.  No murmurs. 

ABDOMEN:  Soft and nontender.  Ventral hernia noted. 

EXTREMITIES:  No edema.  Warm distal extremities.  



CARDIOVASCULAR MEDICATIONS:  Reviewed.  

1. Diltiazem 240 mg daily. 

2. Atorvastatin 40 mg nightly. 

3. Prednisone 5 mg daily. 



STUDIES:  Reviewed.  Potassium 3, bicarbonate 24, sodium 123, chloride 87, 

BUN 16, creatinine 0.76, glucose 89, hemoglobin 11.9, white blood cell 

count 12.9, platelets 274,000, AST 18, ALT 16, alkaline phosphatase 65, 

total bilirubin 1.



ASSESSMENT:  

1. Atrial fibrillation, new diagnosis. 

2. Aortic ectasia. 

3. Prominent celiac artery in the setting of ostial stenosis. 

4. Hypertension. 

5. Dyslipidemia. 

6. Rheumatoid arthritis. 

7. Chronic obstructive pulmonary disease per EMR. 

8. Morbid obesity. 

9. Ventral hernia.  

10. Back discomfort, seemingly musculoskeletal.

RECOMMENDATIONS:  

1. Regarding celiac artery finding on CT, suspect this is post 

obstructive dilatation in the setting of no additional findings of 

significant stenosis of the other mesenteric vessels and absence of 

symptoms concerning for mesenteric ischemia.  At this point, I advised 

against further intervention.  Size wise, will likely need monitoring 

both for aortic ectasia as well as the size of the dilated celiac 

vessel, which can be done on a yearly interval with CT imaging or sooner 

if clinically indicated by worsening symptoms.  

2. I have discussed extensively options for anticoagulation including 

______ versus vitamin K antagonist.  Indications, alternatives, risks 

and benefits discussed including _____ reversible agent with Eliquis and 

some other new oral anticoagulants.  The patient opted at this point to 

initiate Eliquis. 

3. Echocardiogram has been ordered. 

4. Continue diltiazem. 



I thank Dr. Hannon for the opportunity to participate in the care of this 

patient.  



 





DD:  11/29/2018 18:57

DT:  11/29/2018 19:09

Job#:  [id number]

## 2018-11-30 VITALS — SYSTOLIC BLOOD PRESSURE: 165 MMHG | DIASTOLIC BLOOD PRESSURE: 69 MMHG

## 2018-11-30 VITALS — DIASTOLIC BLOOD PRESSURE: 63 MMHG | SYSTOLIC BLOOD PRESSURE: 124 MMHG

## 2018-11-30 VITALS — SYSTOLIC BLOOD PRESSURE: 171 MMHG | DIASTOLIC BLOOD PRESSURE: 71 MMHG

## 2018-11-30 VITALS — DIASTOLIC BLOOD PRESSURE: 67 MMHG | SYSTOLIC BLOOD PRESSURE: 146 MMHG

## 2018-11-30 VITALS — SYSTOLIC BLOOD PRESSURE: 137 MMHG | DIASTOLIC BLOOD PRESSURE: 63 MMHG

## 2018-11-30 RX ADMIN — Medication SCH MG: at 16:47

## 2018-11-30 RX ADMIN — APIXABAN SCH MG: 5 TABLET, FILM COATED ORAL at 10:00

## 2018-11-30 RX ADMIN — ALBUTEROL SULFATE SCH GM: 90 AEROSOL, METERED RESPIRATORY (INHALATION) at 07:00

## 2018-11-30 RX ADMIN — ALBUTEROL SULFATE SCH GM: 90 AEROSOL, METERED RESPIRATORY (INHALATION) at 01:40

## 2018-11-30 RX ADMIN — DILTIAZEM HYDROCHLORIDE SCH MG: 120 CAPSULE, COATED, EXTENDED RELEASE ORAL at 10:00

## 2018-11-30 RX ADMIN — DULOXETINE HYDROCHLORIDE SCH MG: 30 CAPSULE, DELAYED RELEASE ORAL at 10:00

## 2018-11-30 RX ADMIN — ALBUTEROL SULFATE SCH GM: 90 AEROSOL, METERED RESPIRATORY (INHALATION) at 11:00

## 2018-11-30 RX ADMIN — PREDNISONE SCH MG: 5 TABLET ORAL at 10:00

## 2018-11-30 RX ADMIN — SODIUM CHLORIDE SCH MLS/HR: 9 INJECTION, SOLUTION INTRAVENOUS at 15:21

## 2018-11-30 RX ADMIN — ALBUTEROL SULFATE SCH GM: 90 AEROSOL, METERED RESPIRATORY (INHALATION) at 19:00

## 2018-11-30 RX ADMIN — ALPRAZOLAM SCH MG: 0.5 TABLET ORAL at 10:00

## 2018-11-30 RX ADMIN — SODIUM CHLORIDE SCH GM: 9 INJECTION, SOLUTION INTRAVENOUS at 10:00

## 2018-11-30 RX ADMIN — Medication SCH EA: at 21:00

## 2018-11-30 RX ADMIN — PANTOPRAZOLE SODIUM SCH MG: 40 TABLET, DELAYED RELEASE ORAL at 10:00

## 2018-11-30 RX ADMIN — ALPRAZOLAM SCH MG: 0.5 TABLET ORAL at 16:47

## 2018-11-30 RX ADMIN — Medication SCH MG: at 20:30

## 2018-11-30 RX ADMIN — ALBUTEROL SULFATE SCH GM: 90 AEROSOL, METERED RESPIRATORY (INHALATION) at 15:00

## 2018-11-30 RX ADMIN — Medication SCH MG: at 10:00

## 2018-11-30 RX ADMIN — MIRTAZAPINE SCH MG: 15 TABLET, FILM COATED ORAL at 20:30

## 2018-11-30 RX ADMIN — APIXABAN SCH MG: 5 TABLET, FILM COATED ORAL at 20:30

## 2018-11-30 RX ADMIN — SODIUM CHLORIDE SCH MLS/HR: 9 INJECTION, SOLUTION INTRAVENOUS at 09:45

## 2018-11-30 RX ADMIN — ALBUTEROL SULFATE SCH GM: 90 AEROSOL, METERED RESPIRATORY (INHALATION) at 23:00

## 2018-11-30 RX ADMIN — Medication SCH MEQ: at 10:00

## 2018-11-30 RX ADMIN — ALPRAZOLAM SCH MG: 0.5 TABLET ORAL at 20:30

## 2018-11-30 RX ADMIN — DULOXETINE HYDROCHLORIDE SCH MG: 30 CAPSULE, DELAYED RELEASE ORAL at 16:48

## 2018-11-30 NOTE — PROGRESS NOTE
DATE:  November 30, 2018 



PROGRESS NOTE



SUBJECTIVE:  Patient reports no abdominal pain.  Back ache has also 

improved.  She is tolerating oral feeds.  She has not had any bowel 

movement today.



REVIEW OF SYSTEMS

GENERAL:  No fever or chills.

CVS:  No chest pain or palpitation.

RESPIRATORY:  No cough or expectoration.



MEDICATIONS:  Inpatient medication list reviewed, she is on;

1. Apixaban.

2. Atorvastatin.

3. Mirtazapine.

4. Alprazolam.

5. Duloxetine.

6. Celecoxib.

7. Normal saline at 75 mL an hour.

8. Ceftriaxone 1 g IV daily.

9. Diltiazem.

10. Prednisone.

11. Potassium chloride.

12. Pantoprazole 40 mg daily.

13. Folic acid.

14. Temazepam.

15. Fentanyl dermal patch 50 mg every 72 hours.

16. Morphine sulfate 4 mg IV q.4 hours p.r.n. albuterol inhaler.

17. Zofran 4 mg q.4 hours as needed.

18. Hydrocodone/acetaminophen every 6 hours as needed.



PHYSICAL EXAMINATION

VITAL SIGNS:  Temperature 99, pulse 77, respirations 16, and blood pressure 

165/69.

GENERAL:  Not in any acute distress.

HEENT:  Oral mucosa is moist.  Anicteric sclerae.

ABDOMEN:  Obese and soft.  Large lower quadrant abdominal hernia.  No 

rebound, rigidity, or guarding.  Positive bowel sounds.



LABS:  WBC has gone up to 12.97, hemoglobin 11.9, hematocrit 33.8, and 

platelet count 474.  Sodium 123, potassium 3.0, chloride 87, BUN 16, and 

creatinine 0.79.  Urinalysis negative for UTI.



IMPRESSION

1. Hyponatremia.

2. Large abdominal hernia.  Patient is not interested for surgical 

repair.

3. On multiple opioids for pain medication.  This certainly puts her at 

the risk of developing constipation.  Patient is not on any bowel 

regimen.



PLAN:  We will put her on extending bowel regimen, MiraLax twice daily 

along with Senna with Docusate 2 pills at night.  Patient is being given 

antibiotic empirically for leukocytosis.  Source of infection is not clear. 

 I will leave the present medical management to primary team.  







DD:  11/30/2018 20:51

DT:  11/30/2018 21:02

Job#:  [id number]

## 2018-11-30 NOTE — CONSULTATION
DATE OF CONSULTATION:  November 29, 2018 



GI CONSULT NOTE 



REASON FOR CONSULTATION:  Large abdominal hernia.  



HISTORY OF PRESENTING ILLNESS:  [age]-year-old female with past 

medical history of chronic backache, osteoporosis, spinal compression 

fraction, status post kyphoplasty.  She came to the emergency room with 

acute lower back pain.  She had imaging studies that included CT scan of 

the abdomen as well.  That revealed large abdominal hernia with dilated 

small bowel loops suggestive of possible ileus.  GI is being consulted for 

evaluation of small bowel pathology.  The patient is currently eating, and 

passing flatus, as well as having bowel movement.  Chronic backache problem 

is being managed by primary team.



REVIEW OF SYSTEMS:  Twelve-point system reviewed.  Symptomatology is 

limited as per HPI. 



PAST SURGICAL HISTORY:  Multiple abdominal surgeries in the past, 

C-sections, cholecystectomy.  



PAST MEDICAL HISTORY:  Osteoarthritis, hypertension, COPD, reflux, 

osteoporosis, spinal compression fracture, insomnia, anxiety, rheumatoid 

arthritis, recurrent urinary tract infection, obesity, overactive urinary 

bladder, dyslipidemia.



FAMILY HISTORY:  Noncontributory given advanced age.  



SOCIAL HISTORY:  No smoking, alcohol or any illicit drug use.



ALLERGIES:  CIPROFLOXACIN, AMOXICILLIN, CODEINE, AND PENTAZOCINE.



HOME MEDICATIONS

1. Albuterol inhaler.  

2. Alprazolam.

3. Atorvastatin. 

4. Celecoxib.

5. Doxycycline hyclate.

6. Duloxetine. 

7. Esomeprazole. 

8. Fentanyl dermal patch.

9. Fluconazole. 

10. Folic acid.

11. Hydrocodone/acetaminophen.

12. Levofloxacin.

13. Losartan/hydrochlorothiazide.

14. Methotrexate. 

15. Mirtazapine. 

16. Omeprazole 40 mg daily. 

17. Potassium chloride 10 mEq daily.  

18. Prednisone 10 mg daily.  

19. Temazepam 50 mg daily.  



INPATIENT MEDICATION LIST:  Reviewed as per MAR.



PHYSICAL EXAMINATION

VITAL SIGNS:  Temperature 98.8, pulse 78, respiration 20, blood pressure 

116/57, oxygen saturation 95% on room air. 

GENERAL:  Not in any acute distress.  Oral mucosa is moist.  Anicteric 

sclerae.  

LYMPHS:  No neck or axillary adenopathy.  

CVS:  S1, S2 regular. 

LUNGS:  Bilaterally grossly clear. 

ABDOMEN:  Soft, large lower abdominal hernia on lower quadrant, healed 

surgical scar.  Nondistended, nontender.  No mass or hernia.  Bowel sounds 

hypoactive, but present. 

EXTREMITIES:  Warm.  No leg edema. 



LAB:  WBC 12.97, hemoglobin 11.9, hematocrit 33.8, MCV 94.2, platelet count 

474,000.  Sodium 123, potassium 3.0, chloride 87, bicarb 24, BUN 16, 

creatinine 0.76, glucose 89.  Liver enzymes normal.  Troponins normal.  



ABDOMINAL X-RAY:  No evidence of any ileus.  



CT SCAN OF THE ABDOMEN AS WELL AS CTA 

1. No evidence of any aortic dissection or mural hematoma.  No 

retroperitoneal hemorrhage. 

2. Markedly ectatic thoracoabdominal aorta.

3. Prominent main pulmonary artery suggestive of pulmonary hypertension.

4. Treated compression fracture of the midthoracic spine with 

retropulsed fragment into spinal canal.  No new or untreated compression 

deformities.  Stable grade-1 anterolisthesis of L4 or L5.  

5. COPD.  

6. Patulousness of midesophagus containing small amount of fluid.  This 

is likely due to extrinsic compression from ectatic descending thoracic 

aorta.

7. Cholecystectomy.  

8. Large ventral abdominal hernia containing small and large bowel 

loops.

9. Diverticulosis coli.  

10. Low-attenuating lesion in the right kidney is likely a cyst.  This 

can be confirmed with ultrasound on outpatient basis.

11. Thyroid nodule.  



IMPRESSIONS

1. Large abdominal hernia.

2. Ileus, resolved.

3. Hyponatremia.  



PLAN:  Medical management of hyponatremia as per primary team.  Surgical 

evaluation.  Surgery has seen the patient and probably patient is not 

interested in getting abdominal hernia surgery.  From GI standpoint, she 

can be continued on current medical management, as well as oral diet.  

Patient told me that she has had a colonoscopy less than 2 years ago by Dr. Berg's group. 









DD:  11/29/2018 23:45

DT:  11/29/2018 23:57

Job#:  [id number]

## 2018-11-30 NOTE — PROGRESS NOTE
DATE:  November 30, 2018 



CARDIOLOGY PROGRESS NOTE



SUBJECTIVE:  No new complaints today. Anxious about her diagnosis of atrial 

fibrillation. Agrees with Eliquis.



OBJECTIVE

VITAL SIGNS:  Temperature 97.8, heart rate 80, respiratory rate 16, blood 

pressure 137/63, O2 sat 97% on room air. BMI of 32.6.

GENERAL:  In no acute distress. Alert.

NECK:  No JVD.

CHEST:  Clear to auscultation.

CARDIOVASCULAR:  Irregular rate and rhythm. Normal S1 and S2. 

ABDOMEN:  Soft.

EXTREMITIES:  Trace edema.



CARDIOVASCULAR MEDICATIONS:  Reviewed.

1. Diltiazem 240 mg daily.

2. Atorvastatin 40 mg nightly.

3. KCl 10 mEq daily.

4. Prednisone 5 mg daily.

5. Eliquis 5 mg every 12 hours.



STUDIES:  Reviewed. Sodium 123, potassium 3, chloride 87, bicarbonate 24, 

BUN 16, creatinine 0.76, glucose 89. Hemoglobin 11.9, white blood cells 

12.9, platelets 474. AST 18, ALT 15. PTT 25.4.



ASSESSMENT

1. Atrial fibrillation, new diagnosis.

2. Hypertension.

3. Morbid obesity.

4. Chronic back pain with acute exacerbation.

5. Rheumatoid arthritis.

6. Dyslipidemia.

7. Ectatic aorta.

8. Aneurysmal celiac artery.



RECOMMENDATIONS

1. Continue current cardiovascular medications. 

2. Outpatient followup including outpatient stress test is advised.

3. Thromboembolic risk prevention with apixaban 5 mg every 12 hours and 

rate control with diltiazem for now seems appropriate.

4. Will need interval imaging for aortic ectasia and celiac artery 

aneurysm at every yearly interval or sooner as clinically indicated.











DD:  11/30/2018 13:50

DT:  11/30/2018 14:02

Job#:  [id number]

## 2018-12-01 VITALS — DIASTOLIC BLOOD PRESSURE: 69 MMHG | SYSTOLIC BLOOD PRESSURE: 152 MMHG

## 2018-12-01 VITALS — DIASTOLIC BLOOD PRESSURE: 67 MMHG | SYSTOLIC BLOOD PRESSURE: 144 MMHG

## 2018-12-01 VITALS — SYSTOLIC BLOOD PRESSURE: 171 MMHG | DIASTOLIC BLOOD PRESSURE: 79 MMHG

## 2018-12-01 VITALS — SYSTOLIC BLOOD PRESSURE: 139 MMHG | DIASTOLIC BLOOD PRESSURE: 63 MMHG

## 2018-12-01 VITALS — SYSTOLIC BLOOD PRESSURE: 157 MMHG | DIASTOLIC BLOOD PRESSURE: 74 MMHG

## 2018-12-01 VITALS — SYSTOLIC BLOOD PRESSURE: 146 MMHG | DIASTOLIC BLOOD PRESSURE: 67 MMHG

## 2018-12-01 LAB
ANION GAP SERPL CALC-SCNC: 8.8 MMOL/L (ref 8–16)
BASOPHILS # BLD AUTO: 0.1 10*3/UL (ref 0–0.1)
BASOPHILS NFR BLD AUTO: 0.7 % (ref 0–1)
BUN SERPL-MCNC: 18 MG/DL (ref 7–26)
BUN/CREAT SERPL: 28 (ref 6–25)
CALCIUM SERPL-MCNC: 8.2 MG/DL (ref 8.4–10.2)
CHLORIDE SERPL-SCNC: 109 MMOL/L (ref 98–107)
CO2 SERPL-SCNC: 26 MMOL/L (ref 22–29)
DEPRECATED NEUTROPHILS # BLD AUTO: 3.7 10*3/UL (ref 2.1–6.9)
EGFRCR SERPLBLD CKD-EPI 2021: > 60 ML/MIN (ref 60–?)
EOSINOPHIL # BLD AUTO: 0.5 10*3/UL (ref 0–0.4)
EOSINOPHIL NFR BLD AUTO: 6.4 % (ref 0–6)
ERYTHROCYTE [DISTWIDTH] IN CORD BLOOD: 15.6 % (ref 11.7–14.4)
FOLATE SERPL-MCNC: 10 NG/ML (ref 7–15.4)
GLUCOSE SERPLBLD-MCNC: 94 MG/DL (ref 74–118)
HCT VFR BLD AUTO: 25.5 % (ref 34.2–44.1)
HGB BLD-MCNC: 8.4 G/DL (ref 12–16)
IRON SATN MFR SERPL: 9 % (ref 15–50)
IRON SERPL-MCNC: 21 UG/DL (ref 50–170)
LYMPHOCYTES # BLD: 2.2 10*3/UL (ref 1–3.2)
LYMPHOCYTES NFR BLD AUTO: 29.5 % (ref 18–39.1)
MCH RBC QN AUTO: 33.1 PG (ref 28–32)
MCHC RBC AUTO-ENTMCNC: 32.9 G/DL (ref 31–35)
MCV RBC AUTO: 100.4 FL (ref 81–99)
MONOCYTES # BLD AUTO: 0.9 10*3/UL (ref 0.2–0.8)
MONOCYTES NFR BLD AUTO: 11.9 % (ref 4.4–11.3)
NEUTS SEG NFR BLD AUTO: 50.8 % (ref 38.7–80)
PLATELET # BLD AUTO: 281 X10E3/UL (ref 140–360)
POTASSIUM SERPL-SCNC: 3.8 MMOL/L (ref 3.5–5.1)
RBC # BLD AUTO: 2.54 X10E6/UL (ref 3.6–5.1)
SODIUM SERPL-SCNC: 140 MMOL/L (ref 136–145)
TIBC SERPL-MCNC: 242 UG/DL (ref 261–478)
TRANSFERRIN SERPL-MCNC: 173 MG/DL (ref 180–382)
VIT B12 BLD-MCNC: 294 PG/ML (ref 213–816)

## 2018-12-01 RX ADMIN — ALBUTEROL SULFATE SCH GM: 90 AEROSOL, METERED RESPIRATORY (INHALATION) at 22:56

## 2018-12-01 RX ADMIN — Medication SCH EA: at 21:00

## 2018-12-01 RX ADMIN — SODIUM CHLORIDE SCH GM: 9 INJECTION, SOLUTION INTRAVENOUS at 09:55

## 2018-12-01 RX ADMIN — ALBUTEROL SULFATE SCH GM: 90 AEROSOL, METERED RESPIRATORY (INHALATION) at 08:23

## 2018-12-01 RX ADMIN — ALPRAZOLAM SCH MG: 0.5 TABLET ORAL at 09:55

## 2018-12-01 RX ADMIN — DULOXETINE HYDROCHLORIDE SCH MG: 30 CAPSULE, DELAYED RELEASE ORAL at 17:13

## 2018-12-01 RX ADMIN — DILTIAZEM HYDROCHLORIDE SCH MG: 120 CAPSULE, COATED, EXTENDED RELEASE ORAL at 09:55

## 2018-12-01 RX ADMIN — POLYETHYLENE GLYCOL 3350 SCH GM: 17 POWDER, FOR SOLUTION ORAL at 09:00

## 2018-12-01 RX ADMIN — Medication SCH MG: at 09:55

## 2018-12-01 RX ADMIN — Medication SCH MEQ: at 09:55

## 2018-12-01 RX ADMIN — ALBUTEROL SULFATE SCH GM: 90 AEROSOL, METERED RESPIRATORY (INHALATION) at 03:00

## 2018-12-01 RX ADMIN — MIRTAZAPINE SCH MG: 15 TABLET, FILM COATED ORAL at 21:00

## 2018-12-01 RX ADMIN — ALPRAZOLAM SCH MG: 0.5 TABLET ORAL at 15:44

## 2018-12-01 RX ADMIN — SODIUM CHLORIDE SCH MLS/HR: 9 INJECTION, SOLUTION INTRAVENOUS at 00:36

## 2018-12-01 RX ADMIN — ALBUTEROL SULFATE SCH GM: 90 AEROSOL, METERED RESPIRATORY (INHALATION) at 14:50

## 2018-12-01 RX ADMIN — ALPRAZOLAM SCH MG: 0.5 TABLET ORAL at 21:00

## 2018-12-01 RX ADMIN — ALBUTEROL SULFATE SCH GM: 90 AEROSOL, METERED RESPIRATORY (INHALATION) at 19:00

## 2018-12-01 RX ADMIN — ALBUTEROL SULFATE SCH GM: 90 AEROSOL, METERED RESPIRATORY (INHALATION) at 11:08

## 2018-12-01 RX ADMIN — DULOXETINE HYDROCHLORIDE SCH MG: 30 CAPSULE, DELAYED RELEASE ORAL at 09:55

## 2018-12-01 RX ADMIN — PANTOPRAZOLE SODIUM SCH MG: 40 TABLET, DELAYED RELEASE ORAL at 09:55

## 2018-12-01 RX ADMIN — Medication SCH MG: at 21:00

## 2018-12-01 RX ADMIN — PREDNISONE SCH MG: 5 TABLET ORAL at 09:55

## 2018-12-01 NOTE — PROGRESS NOTE
DATE:  December 01, 2018 



CARDIOLOGY PROGRESS NOTE



SUBJECTIVE:  No new complaints.  Back discomfort improved.  Denies chest 

pain or shortness of breath.



OBJECTIVE

VITAL SIGNS:  Temperature 97.8, heart rate 67, respiratory rate 16, blood 

pressure 139/63, O2 sat 97% on room air.

GENERAL:  No acute distress, alert, active.

NECK:  No JVD.

CHEST:  Clear to auscultation.

CARDIOVASCULAR:  Irregularly irregular rate and rhythm.  Normal S1 and S2.  

No S3 or S4.

ABDOMEN:  Soft.

EXTREMITIES:  No edema.



CARDIOVASCULAR MEDICATIONS:  Reviewed.

1. Eliquis 5 mg every 12 hours.

2. Atorvastatin 40 mg q.h.s.

3. Prednisone 5 mg daily.

4. Celebrex 200 mg b.i.d.

5. Diltiazem 240 mg daily.



IMAGING:  Reviewed.  Has preserved left ventricular systolic function on 

echocardiogram, moderate aortic insufficiency.



LABORATORY DATA:  For today, white cell count 7.2, hemoglobin 8.4 down from 

11.9, platelets 281,000.  Creatinine is 0.6.



ASSESSMENT

1. Anemia, worsening in the setting of nonsteroidal anti-inflammatory 

drug and prednisone use.  Patient with prior history of anemia.

2. Atrial fibrillation, new diagnosis, nonvalvular.

3. Moderate aortic insufficiency.

4. Morbid obesity.

5. Hypertension.

6. Chronic back pain.

7. Rheumatoid arthritis.

8. Dyslipidemia.

9. Ectatic aorta.

10. Aneurysmal celiac artery.



RECOMMENDATIONS

1. Discontinue Eliquis for now and initiate anemia workup.

2. PPI advised at this point given use of steroid and NSAIDs with drop 

in H and H.

3. Interval imaging for aortic ectasia and celiac artery aneurysm at 

yearly intervals is advised.



Thank you for the opportunity to participate in the care of Catie.









DD:  12/01/2018 10:18

DT:  12/01/2018 10:25

Job#:  [id number]

## 2018-12-02 VITALS — SYSTOLIC BLOOD PRESSURE: 159 MMHG | DIASTOLIC BLOOD PRESSURE: 76 MMHG

## 2018-12-02 VITALS — DIASTOLIC BLOOD PRESSURE: 74 MMHG | SYSTOLIC BLOOD PRESSURE: 162 MMHG

## 2018-12-02 VITALS — SYSTOLIC BLOOD PRESSURE: 139 MMHG | DIASTOLIC BLOOD PRESSURE: 72 MMHG

## 2018-12-02 VITALS — DIASTOLIC BLOOD PRESSURE: 70 MMHG | SYSTOLIC BLOOD PRESSURE: 158 MMHG

## 2018-12-02 LAB
BASOPHILS # BLD AUTO: 0.1 10*3/UL (ref 0–0.1)
BASOPHILS NFR BLD AUTO: 1 % (ref 0–1)
DEPRECATED NEUTROPHILS # BLD AUTO: 3.7 10*3/UL (ref 2.1–6.9)
EOSINOPHIL # BLD AUTO: 0.5 10*3/UL (ref 0–0.4)
EOSINOPHIL NFR BLD AUTO: 5.7 % (ref 0–6)
ERYTHROCYTE [DISTWIDTH] IN CORD BLOOD: 15.7 % (ref 11.7–14.4)
HCT VFR BLD AUTO: 27.8 % (ref 34.2–44.1)
HGB BLD-MCNC: 9.1 G/DL (ref 12–16)
LYMPHOCYTES # BLD: 3 10*3/UL (ref 1–3.2)
LYMPHOCYTES NFR BLD AUTO: 37 % (ref 18–39.1)
MCH RBC QN AUTO: 33.1 PG (ref 28–32)
MCHC RBC AUTO-ENTMCNC: 32.7 G/DL (ref 31–35)
MCV RBC AUTO: 101.1 FL (ref 81–99)
MONOCYTES # BLD AUTO: 0.9 10*3/UL (ref 0.2–0.8)
MONOCYTES NFR BLD AUTO: 10.9 % (ref 4.4–11.3)
NEUTS SEG NFR BLD AUTO: 44.8 % (ref 38.7–80)
PLATELET # BLD AUTO: 289 X10E3/UL (ref 140–360)
RBC # BLD AUTO: 2.75 X10E6/UL (ref 3.6–5.1)

## 2018-12-02 RX ADMIN — PANTOPRAZOLE SODIUM SCH MG: 40 TABLET, DELAYED RELEASE ORAL at 09:05

## 2018-12-02 RX ADMIN — Medication SCH MG: at 09:05

## 2018-12-02 RX ADMIN — ALPRAZOLAM SCH MG: 0.5 TABLET ORAL at 09:06

## 2018-12-02 RX ADMIN — ALBUTEROL SULFATE SCH GM: 90 AEROSOL, METERED RESPIRATORY (INHALATION) at 14:25

## 2018-12-02 RX ADMIN — DULOXETINE HYDROCHLORIDE SCH MG: 30 CAPSULE, DELAYED RELEASE ORAL at 09:05

## 2018-12-02 RX ADMIN — PREDNISONE SCH MG: 5 TABLET ORAL at 09:06

## 2018-12-02 RX ADMIN — POLYETHYLENE GLYCOL 3350 SCH GM: 17 POWDER, FOR SOLUTION ORAL at 09:06

## 2018-12-02 RX ADMIN — ALBUTEROL SULFATE SCH GM: 90 AEROSOL, METERED RESPIRATORY (INHALATION) at 11:00

## 2018-12-02 RX ADMIN — Medication SCH MEQ: at 09:06

## 2018-12-02 RX ADMIN — ALBUTEROL SULFATE SCH GM: 90 AEROSOL, METERED RESPIRATORY (INHALATION) at 07:00

## 2018-12-02 RX ADMIN — ALPRAZOLAM SCH MG: 0.5 TABLET ORAL at 15:24

## 2018-12-02 RX ADMIN — Medication SCH EA: at 09:52

## 2018-12-02 RX ADMIN — DILTIAZEM HYDROCHLORIDE SCH MG: 120 CAPSULE, COATED, EXTENDED RELEASE ORAL at 09:05

## 2018-12-02 RX ADMIN — SODIUM CHLORIDE SCH GM: 9 INJECTION, SOLUTION INTRAVENOUS at 09:05

## 2018-12-02 RX ADMIN — DULOXETINE HYDROCHLORIDE SCH MG: 30 CAPSULE, DELAYED RELEASE ORAL at 15:24

## 2018-12-02 NOTE — DISCHARGE SUMMARY
CONSULTANTS

1. Dr. Murphy

2. Dr. Chaitanya Hernández

3. Dr. Mohan _______ 



FINAL DIAGNOSES

1. Status post ileus, resolved.  

2. Status post abdominal pain, resolved.

3. Hyponatremia secondary to hydrochlorothiazide.  

4. Hypokalemia secondary to hydrochlorothiazide.

5. Chronic constipation associated with pain medication, narcotic usage 

for chronic pain and rheumatoid arthritis.  

6. Chronic anemia secondary to chronic disease.

7. New-onset atrial fibrillation.

8. Peripheral arterial disease.

9. Status post urinary tract infection, treated.  

10. Chronic abdominal ventral hernia.



SUMMARY:  Patient is a 68-year-old female with multiple chronic medical 

problems, came into the hospital for abdominal pain, found to have ileus.  

She is constipated, treated.  Patient also was found to have a new-onset 

atrial fibrillation.  She was hospitalized.  The patient placed on Eliquis. 

 She is doing well.  She does have chronic anemia.  The patient was 

dehydrated, when she came in.  After rehydration, her hemoglobin/hematocrit 

is more appropriate in this patient.  Patient has echocardiogram showed 

ejection fraction of 55% to 60%.  She has trace TR, mild-to-moderate AI and 

moderate to severe mitral regurgitation with left atrial enlargement.



The patient is, otherwise, stable.  She is doing much better.  She 

tolerated iron infusion with Venofer 100 mg yesterday and 1 dose today 

prior to her discharge.  Her blood pressure slightly elevated, Bystolic 10 

mg daily given.



At this time, she is stable.  She will go home today.  The following 

instructions for the patient to go home after her iron infusion and B12 

injection _______.  



She was stopped on all her antibiotic including doxycycline, fluconazole, 

Levaquin.  She will stop losartan/hydrochlorothiazide.



The patient will resume her other home medications.



HOME DISCHARGE MEDICATIONS 

1. Eliquis 2.5 mg twice a day.  

2. Hemocyte-Plus 1 tablet b.i.d.

3. Senna-S 1 tablet b.i.d.

4. Bystolic 10 mg daily.



The patient is stable, discharged home.  Follow up with Dr. Boyd next 

week and follow up with Dr. Murphy in approximately 1 to 2 weeks as well.



The patient stable, discharged home today.   









DD:  12/02/2018 08:42

DT:  12/02/2018 21:55

Job#:  [id number]

## 2019-10-20 ENCOUNTER — HOSPITAL ENCOUNTER (EMERGENCY)
Dept: HOSPITAL 88 - ER | Age: 69
Discharge: HOME | End: 2019-10-20
Payer: MEDICARE

## 2019-10-20 VITALS — SYSTOLIC BLOOD PRESSURE: 176 MMHG | DIASTOLIC BLOOD PRESSURE: 90 MMHG

## 2019-10-20 VITALS — HEIGHT: 60 IN | WEIGHT: 170 LBS | BODY MASS INDEX: 33.38 KG/M2

## 2019-10-20 DIAGNOSIS — I10: ICD-10-CM

## 2019-10-20 DIAGNOSIS — M06.9: ICD-10-CM

## 2019-10-20 DIAGNOSIS — G89.29: ICD-10-CM

## 2019-10-20 DIAGNOSIS — R30.0: Primary | ICD-10-CM

## 2019-10-20 DIAGNOSIS — N30.91: ICD-10-CM

## 2019-10-20 LAB
BACTERIA URNS QL MICRO: (no result) /HPF
BILIRUB UR QL: NEGATIVE
CLARITY UR: CLEAR
COLOR UR: YELLOW
DEPRECATED RBC URNS MANUAL-ACNC: (no result) /HPF (ref 0–5)
EPI CELLS URNS QL MICRO: (no result) /LPF
KETONES UR QL STRIP.AUTO: NEGATIVE
LEUKOCYTE ESTERASE UR QL STRIP.AUTO: (no result)
NITRITE UR QL STRIP.AUTO: NEGATIVE
PROT UR QL STRIP.AUTO: (no result)
SP GR UR STRIP: >=1.03 (ref 1.01–1.02)
UROBILINOGEN UR STRIP-MCNC: 0.2 MG/DL (ref 0.2–1)

## 2019-10-20 PROCEDURE — 81001 URINALYSIS AUTO W/SCOPE: CPT

## 2019-10-20 PROCEDURE — 99283 EMERGENCY DEPT VISIT LOW MDM: CPT

## 2019-10-20 PROCEDURE — 87086 URINE CULTURE/COLONY COUNT: CPT

## 2019-10-20 NOTE — NUR
C/O OF HEADACHE OVER RIGHT EYE, THIS HAS BEEN GOING ON SINCE FRIDAY. BP 
201/114. HR 84. DR. BROTHERS AWARE AND ORDERING PATIENTS BYSTOLIC 10 MG PO THAT IS 
SCHEDULED FOR TONIGHT AT 8

## 2019-10-20 NOTE — XMS REPORT
Patient Summary Document

                             Created on: 10/20/2019



LIZY GREEN

External Reference #: [id number]

: 1950

Sex: Female



Demographics







                          Address                   [address]

[place]  [address]

 

                          Home Phone                [phone]

 

                          Preferred Language        Unknown

 

                          Marital Status            Unknown

 

                          Christianity Affiliation     Unknown

 

                          Race                      Unknown

 

                                        Additional Race(s)  

 

                          Ethnic Group              Unknown





Author







                          Author                    [facility]

 

                          [facility]

 

                          Address                   Unknown

 

                          Phone                     Unavailable







Support







                Name            Relationship    Address         Phone

 

                    JANE GREEN    PRS                 [address] [place]  [address] [phone]

 

                    NANCY MAX[place]LL    [place]  [address]                  [phone]

 

                    AJAY MAXWELL      PRS                 [address] [place]  [address] [phone]







Care Team Providers







                    Care Team Member Name    Role                Phone

 

                    SOHEILA CARTAGENA        Unavailable         Unavailable







Payers







             Payer Name    Policy Type    Policy Number    Effective Date    Expiration Date







Problems

This patient has no known problems.



Allergies, Adverse Reactions, Alerts







          Allergy Name    Allergy Type    Status    Severity    Reaction(s)    Onset Date    Inactive 

Date                      Treating Clinician        Comments

 

        codeine    DA      Active    SV              2019 00:00:00                     

 

        pentazocine    DA      Active    SV              2019 00:00:00                     

 

        amoxicillin    DA      Active    MO              2019 00:00:00                     

 

        prednisone    DA      Active    MO              2018 00:00:00                     

 

        amoxicillin    DA      Active    MO              2018 00:00:00                     

 

        No Known Allergies    DA      Active    U               2018 00:00:00                     

 

        codeine    DA      Active    MO              2018 00:00:00                     

 

        codeine    DA      Active    SV              2017 00:00:00                     

 

        pentazocine    DA      Active    SV              2017 00:00:00                     







Medications

This patient has no known medications.



Results







           Test Description    Test Time    Test Comments    Text Results    Atomic Results    Result

 Comments

 

                URINALYSIS COMPLETE    2019 22:16:00                      

 

   

 

                UA COLOR (test code=COLU)    YELLOW          YELLOW           

 

                UA APPEARANCE (test code=APPU)    HAZY            CLEAR            

 

                UA GLUCOSE DIPSTICK (test code=DGLUU)    NEGATIVE mg/dL    NEGATIVE         

 

                UA BILIRUBIN DIPSTICK (test code=BILU)    NEGATIVE        NEGATIVE         

 

                UA KETONE DIPSTICK (test code=KETU)    TRACE mg/dL     NEGATIVE         

 

                UA SPECIFIC GRAVITY (test code=SGU)    >=1.030         1.001-1.035      

 

                UA BLOOD DIPSTICK (test code=LIBIA)    NEGATIVE        NEGATIVE         

 

                UA PH DIPSTICK (test code=JARROD)    6.0             5.0-8.0          

 

                UA PROTEIN DIPSTICK (test code=PROU)    TRACE (15) mg/dL    Neg-15           

 

                UA UROBILINIOGEN DIPSTICK (test code=URO)    0.2 mg/dL       0.0-0.2          

 

                UA NITRITE DIPSTICK (test code=KAUSHIK)    POSITIVE        NEGATIVE         

 

                UA LEUKOCYTE ESTERASE W REFLEX (test code=LEUUR)    NEGATIVE        NEGATIVE         

 

                UA WBC (test code=WBCU)    0-5 per HPF     0-5              

 

                UA RBC (test code=RBCU)    0-2 #/HPF       0-5              

 

                UA EPITHELIAL CELLS (test code=EPIU)    FEW per HPF     FEW              

 

                UA BACTERIA (test code=BACU)    MANY #/HPF      NONE             

 

                UA MUCUS (test code=MUCU)    FEW #/LPF       FEW              





Urine Source? Clean CatchURINALYSIS [id number] 21:57:00* 





                Test Item       Value           Reference Range    Comments

 

                UA COLOR (test code=COLU)    YELLOW          YELLOW           

 

                UA APPEARANCE (test code=APPU)    HAZY            CLEAR            

 

                UA GLUCOSE DIPSTICK (test code=DGLUU)    NEGATIVE mg/dL    NEGATIVE         

 

                UA BILIRUBIN DIPSTICK (test code=BILU)    NEGATIVE        NEGATIVE         

 

                UA KETONE DIPSTICK (test code=KETU)    TRACE mg/dL     NEGATIVE         

 

                UA SPECIFIC GRAVITY (test code=SGU)    >=1.030         1.001-1.035      

 

                UA BLOOD DIPSTICK (test code=LIBIA)    NEGATIVE        NEGATIVE         

 

                UA PH DIPSTICK (test code=JARROD)    6.0             5.0-8.0          

 

                UA PROTEIN DIPSTICK (test code=PROU)    TRACE (15) mg/dL    Neg-15           

 

                UA UROBILINIOGEN DIPSTICK (test code=URO)    0.2 mg/dL       0.0-0.2          

 

                UA NITRITE DIPSTICK (test code=KAUSHIK)    POSITIVE        NEGATIVE         

 

                UA LEUKOCYTE ESTERASE W REFLEX (test code=LEUUR)    NEGATIVE        NEGATIVE         

 

                UA WBC (test code=WBCU)     per HPF        0-5              

 

                UA RBC (test code=RBCU)     per HPF        0-5              

 

                UA EPITHELIAL CELLS (test code=EPIU)     per HPF        Few              

 

                UA BACTERIA (test code=BACU)     per HPF        NONE             





Urine Source? Clean Catch- XR CHEST 1 -11-43 19:40:00 FAX:         
Kizzy Brenner -[phone]    [place]:    St: REG----------
---------------------------------------------------------------------  Name:   LIZY RUTH             [facility]                     : [birth year]  Age/S: 69/F           [address] [facility] [facility]                Unit #: [id number]    
 Loc: [place]  [address]              Phys: Kizzy Brenner MD 
                                              Acct: [id number] Dis Date:      
        Status: REG ER                                 PHONE #: [phone]    
Exam Date:     2019                   FAX #: [phone]     
Reason: CODE SEPSIS                                        EXAMS:               
                               CPT CODE:      [id number] XR CHEST 1 V            
                  [id number]                            REASON FOR EXAM: CODE SEPSIS 
             EXAM ORDER DATE: 2019 6:42 PM               Ordering M.DMarino: 
Kizzy Brenner MD                PROCEDURE:  - XR CHEST 1 V               CO
MPARISON: 2018               FINDINGS:  Portable AP frontal view of the ches
t obtained at 7:18 PM       shows clear lungs without evidence of consolidation.
There is no       evidence of effusion. The heart size is minimally enlarged. P
ulmonary       vasculatures are unremarkable.                  IMPRESSION: Large
hiatal hernia.  Minimal cardiomegaly          ** Electronically Signed by ANAHI Olea on 2019 at 1940 **                      Reported and signed by: Tatum Olea M.D.                       CC: Kizzy Brenner MD                      
                                                                                
            Technologist: LUCY PA                                         
Trnscrd Date/Time/By: 2019 () : By: Homer           Orig Print 
D/T: S: 2019 ()                         PAGE  1                       
Signed Report                               B-TYPE NATRIURETIC [id number]
19:37:00* 





                Test Item       Value           Reference Range    Comments

 

                B-TYPE NATRIURETIC PEPTIDE (test code=BNP)    240.16 pgram/mL    0-100            





BASIC METABOLIC [id number] 19:19:00* 





                Test Item       Value           Reference Range    Comments

 

                SODIUM (test code=NA)    147 mmol/L      136-145          

 

                POTASSIUM (test code=K)    3.4 mmol/L      3.5-5.1          

 

                CHLORIDE (test code=CL)    112.0 mmol/L               

 

                CARBON DIOXIDE (test code=CO2)    29.0 mmol/L     21-32            

 

                ANION GAP (test code=GAP)    9.4             10-20            

 

                GLUCOSE (test code=GLU)    90 mg/dL                   

 

                BLOOD UREA NITROGEN (test code=BUN)    24 mg/dL        7-18             

 

                GLOMERULAR FILTRATION RATE (test code=GFR)    > 60 mL/min     >=60            Estimated GFR by 

using Modified MDRD formula.Chronic kidney disease is defined as either kidney 
damageor GFR <60 mL/min/1.73 m2 for >3 months.

 

                CREATININE (test code=CREAT)    0.60 mg/dL      0.55-1.02       **Note change in reference 

range due to change in reagent.**

 

                BUN/CREATININE RATIO (test code=BUN/CREA)    37.5            10-20            

 

                CALCIUM (test code=CA)    8.9 mg/dL       8.5-10.1         





HEPATIC FUNCTION [id number] 19:19:00* 





                Test Item       Value           Reference Range    Comments

 

                TOTAL PROTEIN (test code=PROT)    6.1 gram/dL     6.4-8.2          

 

                ALBUMIN (test code=ALB)    3.4 g/dL        3.4-5.0          

 

                GLOBULIN (test code=GLOB)    2.7 gram/dL     2.7-4.2          

 

                ALBUMIN/GLOBULIN RATIO (test code=A/G)    1.3             0.75-1.50        

 

                BILIRUBIN TOTAL (test code=BILT)    0.30 mg/dL      0.0-1.0          

 

                BILIRUBIN DIRECT (test code=BILD)    0.12 mg/dL      0.0-0.20         

 

                SGOT/AST (test code=AST)    19 IUnit/L      15-37            

 

                SGPT/ALT (test code=ALT)    22 IUnit/L      12-78            

 

                ALKALINE PHOSPHATASE TOTAL (test code=ALKP)    64 IUnit/L                **Note change in

 reference range due to change in reagent.**





[id number] 19:19:00* 





                Test Item       Value           Reference Range    Comments

 

                LIPASE (test code=LIP)    255 U/L         73.0-393.0       





[id number] 19:19:00* 





                Test Item       Value           Reference Range    Comments

 

                TROPONIN-I (test code=TROPI)    <0.015 ng/mL    0-0.045          





LACTIC [id number] 19:17:00* 





                Test Item       Value           Reference Range    Comments

 

                LACTIC ACID (test code=LACT)    0.8 mmol/L      0.4-1.9          





BASIC METABOLIC [id number] 19:14:00* 





                Test Item       Value           Reference Range    Comments

 

                SODIUM (test code=NA)    147 mmol/L      136-145          

 

                POTASSIUM (test code=K)    3.4 mmol/L      3.5-5.1          

 

                CHLORIDE (test code=CL)    112.0 mmol/L               

 

                CARBON DIOXIDE (test code=CO2)     mmol/L         21-32            

 

                ANION GAP (test code=GAP)                    10-20            

 

                GLUCOSE (test code=GLU)     mg/dL                     

 

                BLOOD UREA NITROGEN (test code=BUN)     mg/dL          7-18             

 

                GLOMERULAR FILTRATION RATE (test code=GFR)     mL/min         >=60             

 

                CREATININE (test code=CREAT)     mg/dL          0.55-1.02        

 

                BUN/CREATININE RATIO (test code=BUN/CREA)                    10-20            

 

                CALCIUM (test code=CA)     mg/dL          8.5-10.1         





HEPATIC FUNCTION [id number] 19:14:00* 





                Test Item       Value           Reference Range    Comments

 

                TOTAL PROTEIN (test code=PROT)     gram/dL        6.4-8.2          

 

                ALBUMIN (test code=ALB)     g/dL           3.4-5.0          

 

                GLOBULIN (test code=GLOB)     gram/dL        2.7-4.2          

 

                ALBUMIN/GLOBULIN RATIO (test code=A/G)                    0.75-1.50        

 

                BILIRUBIN TOTAL (test code=BILT)     mg/dL          0.0-1.0          

 

                BILIRUBIN DIRECT (test code=BILD)     mg/dL          0.0-0.20         

 

                SGOT/AST (test code=AST)     IUnit/L        15-37            

 

                SGPT/ALT (test code=ALT)     IUnit/L        12-78            

 

                ALKALINE PHOSPHATASE TOTAL (test code=ALKP)     IUnit/L                   





[id number] 19:14:00* 





                Test Item       Value           Reference Range    Comments

 

                LIPASE (test code=LIP)     U/L            73.0-393.0       





[id number] 19:14:00* 





                Test Item       Value           Reference Range    Comments

 

                TROPONIN-I (test code=TROPI)     ng/mL          0-0.045          





- CT HEAD/BRAIN W/O [id number] 19:06:00  Name: LIZY GREEN         
    [facility]                     : 1950 Age/S: 69  / F         
[address]                Unit #: [id number]     Loc:               
[place]  [address]              Phys: Kizzy Brenner MD                    
                           Acct: [id number]  Dis Date:               Status: 
REG ER                                  PHONE #: [phone]     Exam Date: 
2019                     FAX #: [phone]      Reason: CODE 
SEPSIS                                         EXAMS:                           
                   CPT CODE:      [id number] CT HEAD/BRAIN W/O CONT              
      [id number]                            REASON FOR EXAM: CODE SEPSIS             
 EXAM ORDER DATE: 2019 6:42 PM               Ordering M.D.: Kizzy Brenner MD               PROCEDURE:  - CT HEAD/BRAIN W/O CONT               
COMPARISON: 2018               FINDINGS: CT images of the brain were 
obtained without IV contrast.        Dose modulation, iterative reconstruction, 
and/or weight based       adjustment of the MA/KV was utilized to reduce the 
radiation dose to       as low as reasonably achievable.                The 
brain parenchyma is within normal limits. The gray-white matter       delineat
ion is unremarkable. The ventricles, cisterns, and sulci are       minimally pro
minent. There is no evidence of hemorrhage, mass, mass       effect.  There is n
o evidence of acute or old  infarct. The calvarium       is intact.             
   IMPRESSION: Stable appearance of the chronic findings.  Stable         appea
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
.                   Dr. Brenner was informed of the findings by telephone at 7:0
5 PM                   *******************FOR INTERNAL CODING PURPOSES ONLY*****
***********         RESULT CODE: CVR                    ** Electronically Signed
by ANAHI Olea on 2019 at 4976 **                      Reported and sign
ed by: Arvind Olea M.D.        CC: Kizzy Brenner MD                            
                                                                                
      Technologist:Kellen Isabel RT(R); DEVENDRA W  CTDI:        DLP:        [facility]
nscb Date/Time: 2019 () t.SDR.VTL                        Orig Print D/
T: S: 2019 ()      PAGE  1                       Signed Report        
                      CBC W/AUTO [id number] 19:02:00* 





                Test Item       Value           Reference Range    Comments

 

                WHITE BLOOD CELL (test code=WBC)    6.1 K/mm3       4.5-12.5         

 

                RED BLOOD CELL (test code=RBC)    3.33 mill/mm3    3.7-5.2          

 

                HEMOGLOBIN (test code=HGB)    9.8 gram/dL     11.5-15.5        

 

                HEMATOCRIT (test code=HCT)    30.5 %          36.0-46.0        

 

                MEAN CELL VOLUME (test code=MCV)    91.6 fL         80-98            

 

                MEAN CELL HGB (test code=MCH)    29.4 picogram    27.0-33.0        

 

                MEAN CELL HGB CONCETRATION (test code=MCHC)    32.1 gram/dL    33.0-36.0        

 

                RED CELL DISTRIBUTION WIDTH (test code=RDW)    16.5 %          11.6-16.2        

 

                RED CELL DISTRIBUTION WIDTH SD (test code=RDW-SD)    55.1 fL         37.0-51.0        

 

                PLATELET COUNT (test code=PLT)    144 K/mm3       150-450          

 

                MEAN PLATELET VOLUME (test code=MPV)    11.0 fL         6.7-11.0         

 

                NEUTROPHIL % (test code=NT%)    61.0 %          39.0-69.0        

 

                IMMATURE GRANULOCYTE % (test code=IG%)    0.2 %           0.0-5.0          

 

                LYMPHOCYTE % (test code=LY%)    26.5 %          25.0-55.0        

 

                MONOCYTE % (test code=MO%)    9.5 %           0.0-10.0         

 

                EOSINOPHIL % (test code=EO%)    2.0 %           0.0-5.0          

 

                BASOPHIL % (test code=BA%)    0.8 %           0.0-1.0          

 

                NUCLEATED RBC % (test code=NRBC%)    0.0 %           0-0              

 

                NEUTROPHIL # (test code=NT#)    3.74 K/mm3      1.8-7.7          

 

                IMMATURE GRANULOCYTE # (test code=IG#)    0.01 x10 3/uL    0-0.03           

 

                LYMPHOCYTE # (test code=LY#)    1.62 K/mm3      1.0-5.0          

 

                MONOCYTE # (test code=MO#)    0.58 K/mm3      0-0.8            

 

                EOSINOPHIL # (test code=EO#)    0.12 K/mm3      0.0-0.5          

 

                BASOPHIL # (test code=BA#)    0.05 K/mm3      0.0-0.2          

 

                NUCLEATED RBC # (test code=NRBC#)    0.00 K/mm3      0.0-0.1          

 

                MANUAL DIFF REQUIRED (test code=MDIFF)    NO                               





POC LACTIC [id number] 18:57:00* 





                Test Item       Value           Reference Range    Comments

 

                POC LACTIC ACID (test code=POCLAC)    0.79 MMOL/L     0.4-2.2          





PROTHROMBIN [id number] 18:55:00* 





                Test Item       Value           Reference Range    Comments

 

                PROTHROMBIN TIME PATIENT (test code=PTP)    11.9 seconds    9.0-14.0         

 

                INTERNATIONAL NORMAL RATIO (test code=INR)    1.0             0.8-1.2         The therapeutic range 

for oral anticoagulant therapy formost indications is an international 
normalized ratio (INR)of between 2.0 and 3.0.  The recommended therapeutic 
INRrange for various clinical situations is listed 
below:_________________________________________________________Clinical 
Situation                          INR 
range_________________________________________________________ Pulmonary e
mbolism treatment              (2.0-3.0)Venous thrombosis treatmentVenous 
thrombosis prophylaxis (high risk surgery)Prevention of systemic embolism from: 
       Acute myocardial infarction         Valvular heart disease         Atrial
fibrillation Mechanical prosthetic heart valves          (2.5-3.5)





IS PATIENT ON ANTICOAGULANTS? NTHROMBOPLASTIN TIME [id number] 18:55:00* 





                Test Item       Value           Reference Range    Comments

 

                THROMBOPLASTIN TIME PARTIAL (test code=PTT)    29.1 seconds    25.0-36.5        





IS PATIENT ON ANTICOAGULANTS? N- CT ABD PELVIS W/O [id number] 09:28:00  
Name: LIZY GREEN              [facility]     : 
1950 Age/S: 68  / F         [address] [facility]           Unit #: [id number]
[id number]     Loc:               [place] [address]                Phys: Jaimie Goldstein MD                                              Acct: [id number]  Di
s Date:               Status: [facility] ER                                  PHONE #: [phone]
[phone]     Exam Date: 2019  005                     FAX #: [phone]
[phone]      Reason: R flank pain h/o UTI/kidney stones                  EXAMS:     
                                         CPT CODE:      [id number] CT ABD PELVIS 
W/O CONT                     [address]                    HISTORY: Right flank pain 
with UTI and kidney stones.               COMPARISON: May 9, 2015.              
CT abdomen and pelvis: Stone protocol. Automated exposure control.              
CT of abdomen:               The lung bases are clear.               Hepatic 
parenchyma is unremarkable. No discrete mass. Patient is post       cholecystect
sofia.               Unremarkable spleen. Stomach distended incompletely and is li
mited in       evaluation. The wall appears thickened               Noncontrast 
liver is unremarkable. Unremarkable adrenals.               Kidneys are free fro
m hydroureteronephrosis. No calyceal stones.               No pathologic adenopa
thy.               No bowel obstruction or colitis or diverticulitis or enteriti
s.       Constipation.               CT PELVIS:               Appendix is normal
at visible within the large ventral hernia within       the panniculus containi
ng multiple loops of right colon and small       bowel without evidence for inca
rceration. Diverticulosis of the       sigmoid colon.               Unremarkable
uterus. Unremarkable incompletely distended urinary       bladder. No pelvic pa
thologic adenopathy. No free fluid or free air.               Subcutaneous tissu
es and the musculature are normal in appearance. No       lytic or blastic lesio
ns are noted within the bony skeleton. DJD.                 IMPRESSION:         
         No acute intra-abdominal or intrapelvic pathology. Large anterior      
  pelvic ventral hernia containing loops of small bowel and right colon         
without incarceration. Appendix is normal within the panniculus and         he
rnia sac.      PAGE  1                       Signed Report                    (C
ONTINUED)   [facility] 1950 Age/S: 68  / F         [address] [facility]           Un
it #: [id number]     Loc:               [place] [address]                Phys: 
Fausto Goldstein MD                                              Acct: [id number]
[id number]  Dis Date:               Status: DEP ER                                  
PHONE #: [phone]     Exam Date: 2019  0051                     FAX #:
[phone]      Reason: R flank pain h/o UTI/kidney stones                  E
XAMS:                                               CPT CODE:      [id number] CT 
ABD PELVIS W/O CONT                     [address]               <Continued>      ** 
Electronically Signed by ANAHI Vang on 2019 at 0928 **             
        Reported and signed by: Catrachito Vang M.D.                              
        CC: Fausto Goldstein MD
echnologist:KENNY VIDALES RT(R),CT      CTDI:        DLP:        Trnscb Date
/Time: 2019 (928) GarfieldTH4                        Orig Print D/T: S:  (931)       CTDI:          DLP:          PAGE  2                       
Signed Report                               COMPREHENSIVE METABOLIC PANEL
2019 00:41:00* 





                Test Item       Value           Reference Range    Comments

 

                SODIUM (test code=NA)    143 mmol/L      135-148          

 

                POTASSIUM (test code=K)    3.4 mmol/L      3.5-5.1          

 

                CHLORIDE (test code=CL)    105 mmol/L      101-109          

 

                CARBON DIOXIDE (test code=CO2)    28.1 mmol/L     21-32            

 

                ANION GAP (test code=GAP)    13 mmol/L       10-20            

 

                GLUCOSE (test code=GLU)    101 mg/dL                  

 

                BLOOD UREA NITROGEN (test code=BUN)    14 mg/dL        3-21             

 

                CREATININE (test code=CREAT)    0.61 mg/dL      0.55-1.3         

 

                BUN/CREATININE RATIO (test code=BUN/CREA)    23.0            10-20            

 

                TOTAL PROTEIN (test code=PROT)    6.7 g/dL        6.5-8.4          

 

                ALBUMIN (test code=ALB)    3.4 g/dL        3.4-4.8          

 

                GLOBULIN (test code=GLOB)    3.3 G/DL        1-10             

 

                ALBUMIN/GLOBULIN RATIO (test code=A/G)    1.0 RATIO       0.75-1.50        

 

                CALCIUM (test code=CA)    9.0 mg/dL       8.4-10.2         

 

                BILIRUBIN TOTAL (test code=BILT)    0.70 mg/dL      0.0-1.0          

 

                SGOT/AST (test code=AST)    20 U/L          6-32             

 

                SGPT/ALT (test code=ALT)    22 U/L          12-78           Note: Change in REFERENCE RANGE due to 

new reagent method.

 

                ALKALINE PHOSPHATASE TOTAL (test code=ALKP)    62 U/L                     





[id number] 00:41:00* 





                Test Item       Value           Reference Range    Comments

 

                LIPASE (test code=LIP)    75 U/L          128-270          





COMPREHENSIVE METABOLIC [id number] 00:37:00* 





                Test Item       Value           Reference Range    Comments

 

                SODIUM (test code=NA)    143 mmol/L      135-148          

 

                POTASSIUM (test code=K)    3.4 mmol/L      3.5-5.1          

 

                CHLORIDE (test code=CL)    105 mmol/L      101-109          

 

                CARBON DIOXIDE (test code=CO2)    28.1 mmol/L     21-32            

 

                ANION GAP (test code=GAP)    13 mmol/L       10-20            

 

                GLUCOSE (test code=GLU)    101 mg/dL                  

 

                BLOOD UREA NITROGEN (test code=BUN)    14 mg/dL        3-21             

 

                CREATININE (test code=CREAT)    0.61 mg/dL      0.55-1.3         

 

                BUN/CREATININE RATIO (test code=BUN/CREA)    23.0            10-20            

 

                TOTAL PROTEIN (test code=PROT)     gram/dL        6.4-8.2          

 

                ALBUMIN (test code=ALB)     g/dL           3.4-5.0          

 

                GLOBULIN (test code=GLOB)     g/dL           2.7-4.2          

 

                ALBUMIN/GLOBULIN RATIO (test code=A/G)                    0.75-1.50        

 

                CALCIUM (test code=CA)    9.0 mg/dL       8.4-10.2         

 

                BILIRUBIN TOTAL (test code=BILT)     mg/dL          0.2-1.2          

 

                SGOT/AST (test code=AST)     IUnit/L        15-37            

 

                SGPT/ALT (test code=ALT)     U/L            10-69            

 

                ALKALINE PHOSPHATASE TOTAL (test code=ALKP)     IUnit/L                   





[id number] 00:37:00* 





                Test Item       Value           Reference Range    Comments

 

                LIPASE (test code=LIP)     Unit/L         144-286          





CBC W/AUTO [id number] 00:27:00* 





                Test Item       Value           Reference Range    Comments

 

                WHITE BLOOD CELL (test code=WBC)    10.0 K/mm3      4.5-12.5         

 

                RED BLOOD CELL (test code=RBC)    3.13 mill/mm3    3.7-5.2          

 

                HEMOGLOBIN (test code=HGB)    10.2 gram/dL    11.5-15.5        

 

                HEMATOCRIT (test code=HCT)    31.8 %          36.0-46.0        

 

                MEAN CELL VOLUME (test code=MCV)    101.6 fL        80-98            

 

                MEAN CELL HGB (test code=MCH)    32.6 picogram    27.0-33.0        

 

                MEAN CELL HGB CONCETRATION (test code=MCHC)    32.1 gram/dL    33.0-36.0        

 

                RED CELL DISTRIBUTION WIDTH (test code=RDW)    14.7 %          11.6-16.2        

 

                RED CELL DISTRIBUTION WIDTH SD (test code=RDW-SD)    52.3 fL         39.1-52.0        

 

                PLATELET COUNT (test code=PLT)    239 K/mm3       150-450          

 

                MEAN PLATELET VOLUME (test code=MPV)    10.3 fL         6.7-11.0         

 

                NEUTROPHIL % (test code=NT%)    67.3 %          39.0-69.0        

 

                LYMPHOCYTE % (test code=LY%)    21.0 %          25.0-55.0        

 

                MONOCYTE % (test code=MO%)    10.3 %          0.0-10.0         

 

                EOSINOPHIL % (test code=EO%)    0.9 %           0.0-5.0          

 

                BASOPHIL % (test code=BA%)    0.5 %           0.0-1.0          

 

                NEUTROPHIL # (test code=NT#)    6.71 K/mm3      1.8-7.7          

 

                LYMPHOCYTE # (test code=LY#)    2.10 K/mm3      1.0-5.0          

 

                MONOCYTE # (test code=MO#)    1.03 K/mm3      0-0.8            

 

                EOSINOPHIL # (test code=EO#)    0.09 K/mm3      0.0-0.5          

 

                BASOPHIL # (test code=BA#)    0.05 K/mm3      0.0-0.2          

 

                MANUAL DIFF REQUIRED (test code=MDIFF)    NO                               





URINALYSIS [id number] 00:15:00* 





                Test Item       Value           Reference Range    Comments

 

                UA COLOR (test code=COLU)    YELLOW          YELLOW           

 

                UA APPEARANCE (test code=APPU)    CLEAR           CLEAR            

 

                UA GLUCOSE DIPSTICK (test code=DGLUU)    norm mg/dL      NEGATIVE         

 

                UA BILIRUBIN DIPSTICK (test code=BILU)    NEGATIVE mg/dL    NEGATIVE         

 

                UA KETONE DIPSTICK (test code=KETU)    neg mg/dL       NEGATIVE         

 

                UA SPECIFIC GRAVITY (test code=SGU)    1.010           1.001-1.035      

 

                UA BLOOD DIPSTICK (test code=LIBIA)    neg Esteban/uL      NEGATIVE         

 

                UA PH DIPSTICK (test code=JARROD)    6.0             5.0-8.0          

 

                UA PROTEIN DIPSTICK (test code=PROU)    15 (TRACE) mg/dL    Neg-15           

 

                UA UROBILINIOGEN DIPSTICK (test code=URO)    norm mg/dL      0.0-0.2          

 

                UA NITRITE DIPSTICK (test code=KAUSHKI)    NEGATIVE        NEGATIVE         

 

                    UA LEUKOCYTE ESTERASE DIPSTICK (test code=LEUU)    25 Kai/uL (Trace) uL    NEGATIVE 

                                         

 

                UA WBC (test code=WBCU)    0-5 per HPF     0-5              

 

                UA RBC (test code=RBCU)    0-3 per HPF     0-5              

 

                UA EPITHELIAL CELLS (test code=EPIU)    Rare (0-1/hpf) per HPF    Few              

 

                UA BACTERIA (test code=BACU)    FEW per HPF     NONE             





URINALYSIS W/O [id number] 00:15:00* 





                Test Item       Value           Reference Range    Comments

 

                UA MICROSCOPIC NEEDED? (test code=UAMICRO)    YES                              





URINALYSIS [id number] 00:08:00* 





                Test Item       Value           Reference Range    Comments

 

                UA COLOR (test code=COLU)    YELLOW          YELLOW           

 

                UA APPEARANCE (test code=APPU)    CLEAR           CLEAR            

 

                UA GLUCOSE DIPSTICK (test code=DGLUU)    norm mg/dL      NEGATIVE         

 

                UA BILIRUBIN DIPSTICK (test code=BILU)    NEGATIVE mg/dL    NEGATIVE         

 

                UA KETONE DIPSTICK (test code=KETU)    neg mg/dL       NEGATIVE         

 

                UA SPECIFIC GRAVITY (test code=SGU)    1.010           1.001-1.035      

 

                UA BLOOD DIPSTICK (test code=LIBIA)    neg Esteban/uL      NEGATIVE         

 

                UA PH DIPSTICK (test code=JARROD)    6.0             5.0-8.0          

 

                UA PROTEIN DIPSTICK (test code=PROU)    15 (TRACE) mg/dL    Neg-15           

 

                UA UROBILINIOGEN DIPSTICK (test code=URO)    norm mg/dL      0.0-0.2          

 

                UA NITRITE DIPSTICK (test code=KAUSHIK)    NEGATIVE        NEGATIVE         

 

                    UA LEUKOCYTE ESTERASE DIPSTICK (test code=LEUU)    25 Kai/uL (Trace) uL    NEGATIVE 

                                         

 

                UA WBC (test code=WBCU)     per HPF        0-5              

 

                UA RBC (test code=RBCU)     per HPF        0-5              

 

                UA EPITHELIAL CELLS (test code=EPIU)     per HPF        Few              

 

                UA BACTERIA (test code=BACU)     per HPF        NONE             





URINALYSIS W/O [id number] 00:08:00* 





                Test Item       Value           Reference Range    Comments

 

                UA MICROSCOPIC NEEDED? (test code=UAMICRO)    YES                              





URINALYSIS [id number] 00:08:00* 





                Test Item       Value           Reference Range    Comments

 

                UA COLOR (test code=COLU)    YELLOW          YELLOW           

 

                UA APPEARANCE (test code=APPU)    CLEAR           CLEAR            

 

                UA GLUCOSE DIPSTICK (test code=DGLUU)    norm mg/dL      NEGATIVE         

 

                UA BILIRUBIN DIPSTICK (test code=BILU)    NEGATIVE mg/dL    NEGATIVE         

 

                UA KETONE DIPSTICK (test code=KETU)    neg mg/dL       NEGATIVE         

 

                UA SPECIFIC GRAVITY (test code=SGU)    1.010           1.001-1.035      

 

                UA BLOOD DIPSTICK (test code=LIBIA)    neg Esteban/uL      NEGATIVE         

 

                UA PH DIPSTICK (test code=JARROD)    6.0             5.0-8.0          

 

                UA PROTEIN DIPSTICK (test code=PROU)    15 (TRACE) mg/dL    Neg-15           

 

                UA UROBILINIOGEN DIPSTICK (test code=URO)    norm mg/dL      0.0-0.2          

 

                UA NITRITE DIPSTICK (test code=KAUSHIK)    NEGATIVE        NEGATIVE         

 

                    UA LEUKOCYTE ESTERASE DIPSTICK (test code=LEUU)    25 Kai/uL (Trace) uL    NEGATIVE 

                                         

 

                UA WBC (test code=WBCU)     per HPF        0-5              

 

                UA RBC (test code=RBCU)     per HPF        0-5              

 

                UA EPITHELIAL CELLS (test code=EPIU)     per HPF        Few              

 

                UA BACTERIA (test code=BACU)     per HPF        NONE             





URINALYSIS W/O [id number] 00:08:00* 





                Test Item       Value           Reference Range    Comments

 

                UA MICROSCOPIC NEEDED? (test code=UAMICRO)    YES                              





ABDOMEN-1VIEW (KUB)2018 06:47:00                                          
                                           [place] [address]    
 Patient Name: LIZY GREEN                                   MR #: [id number]
                    : 1950                                   Age/Sex: 
68/F  Acct #: [id number]                              Req #: [id number]  Adm 
Physician: SOHEILA CARTAGENA MD                                        Ordered by: 
SOHEILA CARTAGENA MD                            Report #: [id number]        Location: 
[facility]                                    Room/Bed: [facility] 185-1          
___________________________________________________
________________________________________________    Procedure:  DX/ABDO
MEN-1VIEW (KUB)  Exam Date: 18                            Exam Time: 0520 
                                            REPORT STATUS: Signed    EXAM:  ABD
OMEN-1VIEW (KUB), supine   INDICATION: Ileus   COMPARISON: CT of the abdomen  and abdominal x-ray 2016      FINDINGS:   LINES/TU
BES: None      BOWEL PATTERN: No evidence for obstruction.      SOFT TISSUES: Ewing
rgical clips right upper quadrant of the abdomen.      LUNG BASES: No consolidat
ions.      BONES: No acute findings.      IMPRESSION:   No evidence of ileus    
             Signed by: Dr. Esther Gutierrez M.D. on 2018 6:48 AM       
Dictated By: ESTHER GUTIERREZ MD  Electronically Signed By: ESTHER GUTIERREZ MD on 18  Transcribed By: FANNY on 18       COPY TO:   HOLDEN CARTAGENA MD        CTA ABD/[id number] 17:00:00                                  
                                                   [place] [address]      Patient Name: LIZY GREEN                                   
MR #: [id number]                     : 1950                            
      Age/Sex: 68/F  Acct #: [id number]                              Req #: 
[id number]  Adm Physician:                                                      
Ordered by: ITALIA LOGAN MD                            Report #: [id number]  
     Location: ER                                      Room/Bed:                
    ______________________________________________
_____________________________________________________    Procedure: [id number] CT
/CTA ABD/PELVIS  Exam Date: 18                            Exam Time: 1543 
                                            REPORT STATUS: Signed    CT angiogr
am of the chest, abdomen and pelvis.      CPT code number: 58139,19579,97657    
 History: Midthoracic back pain and chest pain when bending forward.      Techn
ique: Multidetector helical CT angiography was carried out from the   thoracic i
nlet to the lesser trochanters before and following intravenous   contrast admin
istration.  Thin section image collimation was utilized and 3-D   images were po
stprocessed on a separate workstation.  Parenchymal organ imaging   will be limi
carmelita by arterial phase of contrast administration.      Dose reduction techniques
used: Automated exposure control, adjustment of the   mAs and/or kVp according 
to patient size, standardized low-dose protocol,   and/or iterative reconstructi
on technique.      RADIATION DOSE:        Total DLP: 1255.3 mGy*cm            Es
timated effective dose: (DLP x 0.015 x size factor) mSv        CTDIvol has been 
reviewed. It is below the limits set by the Radiation   Protocol Committee (RPC)
.      Comparison: CT abdomen/pelvis 2016, CT abdomen/pelvis 2015   
  CT scan thorax:      Thyroid/base of neck:  Low attenuating lesion the left l
obe measures 5 mm. A   nodule in the right lobe measures 1.9 x 1.3 cm.  The eileen
mata of the lower   neck is unremarkable.      Pleura: No pleural effusion or p
leural based mass.      Heart: Mild cardiomegaly with left ventricular hypertrop
hy. No pericardial   effusion. Mild calcifications of the mitral valve.      Lym
ph nodes: No enlarged axillary, supraclavicular, mediastinal, or hilar lymph   n
odes..      Pulmonary vessels: Main pulmonary artery measures 3.0 cm. No filling
defects.      Pulmonary parenchyma:       Right lung: Diffusely hyperinflated. 
No infiltrate or mass. Wispy bands of   subsegmental atelectasis in the middle a
nd lower lobes.      Left lung: Diffusely hyperinflated. No mass or infiltrate. 
    CT scan of the abdomen:      Liver: Normal in attenuation without mass.     
Spleen: Normal in attenuation and size without mass.      Pancreas: Mild fatty 
atrophy. No mass or ductal dilatation.      Adrenal glands: No evidence for mas
s..      Gallbladder: Absent.       Biliary tree: The intrahepatic and intrahepa
tic bile ducts are diffusely   distended. The common bile duct measures 12 mm in
diameter and tapers as it   approaches the ampulla. No intraluminal filling def
ects to suggest   choledocholithiasis.      Retroperitoneum: No retroperitoneal 
hemorrhage.      Kidneys: Punctate calculus in the upper pole of the right kidne
y. Symmetric   enhancement of the kidneys. No hydronephrosis. Low attenuating le
zeb in the   posterior upper pole of the right kidney measures 0.9 x 1.0 cm.   
  Bowel and mesentery:       Esophagus: Patulous with dependently layering fluid
in the lower esophagus.   There is narrowing of the esophagus as the descending 
thoracic aorta crosses   midline.      Stomach: Collapsed.   Small bowel: Small 
bowel loops are distended to 2.8 cm. Several small bowel   loops contain fluid. 
A large ventral abdominal wall hernia is redemonstrated   containing multiple 
loops of small and large bowel. No evidence of mesenteric   edema.      Large coni
wel: The majority of the large bowel including the cecum and appendix   are in t
he hernia. Diverticulosis coli is present without associated   inflammation. No 
large bowel dilatation.      CT scan pelvis:      Urinary bladder: Well distende
d and is normal. No ureteral dilatation.      Lymph nodes: No evidence of lympha
denopathy.      The uterus is present and normal in morphology. No adnexal mass.
     No free fluid or fluid collection.      Soft tissues: Diastases of the rec
tus abdominis is redemonstrated with hernia   as described above.      Bones: Di
ffusely demineralized. Moderate degenerative changes of the spine.   There is st
able grade 1 anterolisthesis of L4 on L5 without pars defects. There   is a ashlie
carmelita compression fracture of a midthoracic vertebral body, likely T6   with verte
bral plana and protrusion of the posterior cortex into the spinal   canal. There
are no additional compression deformities. There are moderate   generative wall
ges of the left shoulder.      CT angiography:      1. Aortic sinus: 3.4 cm.   2
. Sinotubular junction: 3.0 cm   3. Proximal ascending aorta:3.8 x 3.8 cm   4. P
roximal aortic arch:3.5 cm   5. Mid aortic arch:2.9 cm   6. Distal aortic arch:2
.7 x 3.0 cm   7. Descending thoracic aorta:2.9 cm   8. Aorta at the diaphragm:2.
7 cm   9. Aorta at the celiac artery:2.6 cm   10. Aorta at the cranial most howard
l artery:2.1 x 2.2 cm   11.  Aorta at the caudal-most renal artery:2.0 x 2.3 cm 
 12. Infrarenal aorta: 2.0 x 2.0 cm   13.  Bifurcation:1.7 x 1.9 cm   14.  Morp
hology:The thoracoabdominal aorta is markedly ectatic. The descending   thoracic
aorta crosses to the right at the left atrium and then turns back to   midline 
at the diaphragmatic hiatus. There is no evidence of mural hematoma or   displac
ed mural calcification in the thoracic or abdominal aorta. No periaortic   infla
mmation. No evidence of dissection or penetrating ulcer.      There is ectasia o
f the great vessels. The left vertebral artery arises   directed from the aortic
arch. No evidence of stenosis or dissection of the   great vessel origins.      
Celiac artery: Mildly narrowed at the origin to 4 mm with dilatation to 9 mm at 
 the bifurcation of the hepatic and splenic arteries. Branching of the celiac   
artery is conventional.      SMA: Origin measures 8 mm. No stenosis.      Renal 
arteries: 2 arteries supply the right kidney. No significant stenosis at   the 
origins of the main renal arteries.      KING: Patent.   Iliac arteries: Right c
ommon iliac artery measures 1.3 cm. Left common iliac   artery measures 1.2 cm. 
There is ectasia of the internal and external iliac   arteries.   Femoral arteri
es: Right femoral artery measures 1.2 cm. Left femoral artery   measures 0.9 cm.
     IMPRESSION:      1.  No evidence of aortic dissection or mural hematoma. No
retroperitoneal   hemorrhage.   2.  Markedly ectatic thoracoabdominal aorta as 
well as ectasia of the great   vessels of the neck and iliac arteries. Top norm
al size of the ascending aorta.   No significant burden of atherosclerotic plaqu
e. No significant stenoses of the   visceral vasculature of the abdomen. Aneurys
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 No associated t
hrombus.   3. Prominent main pulmonary artery suggestive of pulmonary artery hyp
ertension.   No evidence of pulmonary embolus   4.  Treated compression fracture
of the midthoracic spine with retropulsed   fragments into the spinal canal. No 
new or untreated compression deformities.   Stable grade 1 anterolisthesis of L4
on L5.   5. COPD.   6. Patulousness of the midesophagus containing a small frantz
unt of fluid. This is   likely due to extrinsic compression from the ectatic sherita
cending thoracic aorta.   7. Cholecystectomy with intrahepatic and intrahepatic 
biliary ductal dilatation   secondary to reservoir effect and ampullary stenosis
. No evidence of   choledocholithiasis.   8.  Large ventral abdominal wall herni
a containing small and large bowel.   Mildly prominent small bowel loops are sug
gestive of ileus or very low-grade   partial small bowel obstruction. No fluid i
n the hernia sac.   9. Diverticulosis coli. No evidence for bowel obstruction or
inflammation.   10. Low attenuating lesion in the right kidney is likely a cyst.
This can be   confirmed with ultrasound on an outpatient basis.   11. Thyroid 
nodules as described above. Recommend evaluation of the thyroid   gland with ult
rasound.         Signed by: Dr. Nikhil Lui MD on 2018 5:31 PM     
  Dictated By: NIKHIL LUI MD  Electronically Signed By: NIKHIL LUI MD on 18 173  Transcribed By: FANNY on 18       COPY TO:   
ITALIA LOGAN MD        CTA [id number] 17:00:00                          
                                                           [place] [address]      Patient Name: LIZY GREEN                         
         MR #: [id number]                     : 1950                   
               Age/Sex: 68/F  Acct #: [id number]                              
Req #: [id number]  Adm Physician:                                               
      Ordered by: ITALIA LOGAN MD                            Report #: [id number]
[id number]        Location: ER                                      Room/Bed:         
           ______________________________________________
_____________________________________________________    Procedure: [id number] CT
/CTA CHEST  Exam Date: 11/28/18                            Exam Time: 1543      
                                       REPORT STATUS: Signed    CT angiogram of 
the chest, abdomen and pelvis.      CPT code number: 59577,[id number]      His
tory: Midthoracic back pain and chest pain when bending forward.      Technique:
Multidetector helical CT angiography was carried out from the   thoracic inlet 
to the lesser trochanters before and following intravenous   contrast administra
tion.  Thin section image collimation was utilized and 3-D   images were postpro
cessed on a separate workstation.  Parenchymal organ imaging   will be limited b
y arterial phase of contrast administration.      Dose reduction techniques used
: Automated exposure control, adjustment of the   mAs and/or kVp according to pa
tient size, standardized low-dose protocol,   and/or iterative reconstruction te
chnique.      RADIATION DOSE:        Total DLP: 1255.3 mGy*cm            Estimat
ed effective dose: (DLP x 0.015 x size factor) mSv        CTDIvol has been revie
wed. It is below the limits set by the Radiation   Protocol Committee (RPC).    
 Comparison: CT abdomen/pelvis 2016, CT abdomen/pelvis 2015      CT 
scan thorax:      Thyroid/base of neck:  Low attenuating lesion the left lobe m
easures 5 mm. A   nodule in the right lobe measures 1.9 x 1.3 cm.  The remainder
of the lower   neck is unremarkable.      Pleura: No pleural effusion or pleural
based mass.      Heart: Mild cardiomegaly with left ventricular hypertrophy. No 
pericardial   effusion. Mild calcifications of the mitral valve.      Lymph no
sherita: No enlarged axillary, supraclavicular, mediastinal, or hilar lymph   nodes.
.      Pulmonary vessels: Main pulmonary artery measures 3.0 cm. No filling defe
cts.      Pulmonary parenchyma:       Right lung: Diffusely hyperinflated. No in
filtrate or mass. Wispy bands of   subsegmental atelectasis in the middle and lo
wer lobes.      Left lung: Diffusely hyperinflated. No mass or infiltrate.      
CT scan of the abdomen:      Liver: Normal in attenuation without mass.      Spl
een: Normal in attenuation and size without mass.      Pancreas: Mild fatty atro
phy. No mass or ductal dilatation.      Adrenal glands: No evidence for mass..  
   Gallbladder: Absent.       Biliary tree: The intrahepatic and intrahepatic b
ile ducts are diffusely   distended. The common bile duct measures 12 mm in diam
eter and tapers as it   approaches the ampulla. No intraluminal filling defects 
to suggest   choledocholithiasis.      Retroperitoneum: No retroperitoneal hemor
rhage.      Kidneys: Punctate calculus in the upper pole of the right kidney. Sy
mmetric   enhancement of the kidneys. No hydronephrosis. Low attenuating lesion 
in the   posterior upper pole of the right kidney measures 0.9 x 1.0 cm.      Coni
wel and mesentery:       Esophagus: Patulous with dependently layering fluid in 
the lower esophagus.   There is narrowing of the esophagus as the descending tho
racic aorta crosses   midline.      Stomach: Collapsed.   Small bowel: Small bow
el loops are distended to 2.8 cm. Several small bowel   loops contain fluid. A l
arge ventral abdominal wall hernia is redemonstrated   containing multiple loops
of small and large bowel. No evidence of mesenteric   edema.      Large bowel: 
The majority of the large bowel including the cecum and appendix   are in the he
rnia. Diverticulosis coli is present without associated   inflammation. No large
bowel dilatation.      CT scan pelvis:      Urinary bladder: Well distended and 
is normal. No ureteral dilatation.      Lymph nodes: No evidence of lymphadenop
athy.      The uterus is present and normal in morphology. No adnexal mass.     
No free fluid or fluid collection.      Soft tissues: Diastases of the rectus a
bdominis is redemonstrated with hernia   as described above.      Bones: Diffuse
ly demineralized. Moderate degenerative changes of the spine.   There is stable 
grade 1 anterolisthesis of L4 on L5 without pars defects. There   is a treated c
ompression fracture of a midthoracic vertebral body, likely T6   with vertebral 
plana and protrusion of the posterior cortex into the spinal   canal. There are 
no additional compression deformities. There are moderate   generative changes o
f the left shoulder.      CT angiography:      1. Aortic sinus: 3.4 cm.   2. Sin
otubular junction: 3.0 cm   3. Proximal ascending aorta:3.8 x 3.8 cm   4. Proxim
al aortic arch:3.5 cm   5. Mid aortic arch:2.9 cm   6. Distal aortic arch:2.7 x 
3.0 cm   7. Descending thoracic aorta:2.9 cm   8. Aorta at the diaphragm:2.7 cm 
 9. Aorta at the celiac artery:2.6 cm   10. Aorta at the cranial most renal art
esteban:2.1 x 2.2 cm   11.  Aorta at the caudal-most renal artery:2.0 x 2.3 cm   12.
Infrarenal aorta: 2.0 x 2.0 cm   13.  Bifurcation:1.7 x 1.9 cm   14.  Morpholog
y:The thoracoabdominal aorta is markedly ectatic. The descending   thoracic aort
a crosses to the right at the left atrium and then turns back to   midline at th
e diaphragmatic hiatus. There is no evidence of mural hematoma or   displaced mu
ral calcification in the thoracic or abdominal aorta. No periaortic   inflammati
on. No evidence of dissection or penetrating ulcer.      There is ectasia of the
great vessels. The left vertebral artery arises   directed from the aortic arch.
No evidence of stenosis or dissection of the   great vessel origins.      Celiac
artery: Mildly narrowed at the origin to 4 mm with dilatation to 9 mm at   the 
bifurcation of the hepatic and splenic arteries. Branching of the celiac   art
esteban is conventional.      SMA: Origin measures 8 mm. No stenosis.      Renal art
eries: 2 arteries supply the right kidney. No significant stenosis at   the orig
ins of the main renal arteries.      KING: Patent.   Iliac arteries: Right common
iliac artery measures 1.3 cm. Left common iliac   artery measures 1.2 cm. There 
is ectasia of the internal and external iliac   arteries.   Femoral arteries: R
ight femoral artery measures 1.2 cm. Left femoral artery   measures 0.9 cm.     
IMPRESSION:      1.  No evidence of aortic dissection or mural hematoma. No ret
roperitoneal   hemorrhage.   2.  Markedly ectatic thoracoabdominal aorta as well
as ectasia of the great   vessels of the neck and iliac arteries. Top normal si
ze of the ascending aorta.   No significant burden of atherosclerotic plaque. No
significant stenoses of the   visceral vasculature of the abdomen. Aneurysmal d
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 No associated thromb
us.   3. Prominent main pulmonary artery suggestive of pulmonary artery hyperten
zeb.   No evidence of pulmonary embolus   4.  Treated compression fracture of t
he midthoracic spine with retropulsed   fragments into the spinal canal. No new 
or untreated compression deformities.   Stable grade 1 anterolisthesis of L4 on 
L5.   5. COPD.   6. Patulousness of the midesophagus containing a small amount o
f fluid. This is   likely due to extrinsic compression from the ectatic descendi
ng thoracic aorta.   7. Cholecystectomy with intrahepatic and intrahepatic bilia
ry ductal dilatation   secondary to reservoir effect and ampullary stenosis. No 
evidence of   choledocholithiasis.   8.  Large ventral abdominal wall hernia con
taining small and large bowel.   Mildly prominent small bowel loops are suggesti
ve of ileus or very low-grade   partial small bowel obstruction. No fluid in the
hernia sac.   9. Diverticulosis coli. No evidence for bowel obstruction or infl
ammation.   10. Low attenuating lesion in the right kidney is likely a cyst. Thi
s can be   confirmed with ultrasound on an outpatient basis.   11. Thyroid nodul
es as described above. Recommend evaluation of the thyroid   gland with ultrasou
nd.         Signed by: Dr. Nikhil Lui MD on 2018 5:31 PM        Di
ctated By: NIKHIL LUI MD  Electronically Signed By: NIKHIL LUI MD 
on [id number]  Transcribed By: FANNY on 18       COPY TO:   ITALIA DURAN MD

## 2019-10-26 ENCOUNTER — HOSPITAL ENCOUNTER (EMERGENCY)
Dept: HOSPITAL 88 - ER | Age: 69
Discharge: HOME | End: 2019-10-26
Payer: MEDICARE

## 2019-10-26 VITALS — HEIGHT: 60 IN | WEIGHT: 170 LBS | BODY MASS INDEX: 33.38 KG/M2

## 2019-10-26 DIAGNOSIS — B37.0: Primary | ICD-10-CM

## 2019-10-26 DIAGNOSIS — B37.3: ICD-10-CM

## 2019-10-26 DIAGNOSIS — M06.9: ICD-10-CM

## 2019-10-26 DIAGNOSIS — I10: ICD-10-CM

## 2019-10-26 LAB
BACTERIA URNS QL MICRO: (no result) /HPF
BILIRUB UR QL: NEGATIVE
CLARITY UR: CLEAR
COLOR UR: YELLOW
DEPRECATED RBC URNS MANUAL-ACNC: (no result) /HPF (ref 0–5)
EPI CELLS URNS QL MICRO: (no result) /LPF
KETONES UR QL STRIP.AUTO: NEGATIVE
LEUKOCYTE ESTERASE UR QL STRIP.AUTO: NEGATIVE
NITRITE UR QL STRIP.AUTO: NEGATIVE
PROT UR QL STRIP.AUTO: (no result)
SP GR UR STRIP: 1.02 (ref 1.01–1.02)
UROBILINOGEN UR STRIP-MCNC: 0.2 MG/DL (ref 0.2–1)
WBC #/AREA URNS HPF: (no result) /HPF (ref 0–5)

## 2021-08-08 ENCOUNTER — HOSPITAL ENCOUNTER (EMERGENCY)
Dept: HOSPITAL 88 - ER | Age: 71
Discharge: HOME | End: 2021-08-08
Payer: MEDICARE

## 2021-08-08 VITALS — DIASTOLIC BLOOD PRESSURE: 71 MMHG | SYSTOLIC BLOOD PRESSURE: 136 MMHG

## 2021-08-08 VITALS — HEIGHT: 60 IN | WEIGHT: 170 LBS | BODY MASS INDEX: 33.38 KG/M2

## 2021-08-08 DIAGNOSIS — S40.022A: Primary | ICD-10-CM

## 2021-08-08 DIAGNOSIS — S80.02XA: ICD-10-CM

## 2021-08-08 DIAGNOSIS — M19.90: ICD-10-CM

## 2021-08-08 DIAGNOSIS — Z91.81: ICD-10-CM

## 2021-08-08 DIAGNOSIS — I10: ICD-10-CM

## 2021-08-08 DIAGNOSIS — Z87.440: ICD-10-CM
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
## 2021-08-08 PROCEDURE — 99282 EMERGENCY DEPT VISIT SF MDM: CPT

## 2021-08-22 ENCOUNTER — HOSPITAL ENCOUNTER (EMERGENCY)
Dept: HOSPITAL 88 - ER | Age: 71
Discharge: HOME | End: 2021-08-22
Payer: MEDICARE

## 2021-08-22 VITALS — WEIGHT: 170 LBS | HEIGHT: 60 IN | BODY MASS INDEX: 33.38 KG/M2

## 2021-08-22 DIAGNOSIS — I10: ICD-10-CM

## 2021-08-22 DIAGNOSIS — M06.9: ICD-10-CM

## 2021-08-22 DIAGNOSIS — G89.29: ICD-10-CM

## 2021-08-22 DIAGNOSIS — M25.561: ICD-10-CM

## 2021-08-22 DIAGNOSIS — M25.562: ICD-10-CM

## 2021-08-22 DIAGNOSIS — M25.512: Primary | ICD-10-CM

## 2021-08-22 PROCEDURE — 99282 EMERGENCY DEPT VISIT SF MDM: CPT

## 2022-05-16 ENCOUNTER — HOSPITAL ENCOUNTER (EMERGENCY)
Dept: HOSPITAL 88 - ER | Age: 72
Discharge: HOME | End: 2022-05-16
Payer: MEDICARE

## 2022-05-16 VITALS — WEIGHT: 170 LBS | HEIGHT: 60 IN | BODY MASS INDEX: 33.38 KG/M2

## 2022-05-16 DIAGNOSIS — M25.562: ICD-10-CM

## 2022-05-16 DIAGNOSIS — G89.29: ICD-10-CM

## 2022-05-16 DIAGNOSIS — M06.9: ICD-10-CM

## 2022-05-16 DIAGNOSIS — N39.0: ICD-10-CM

## 2022-05-16 DIAGNOSIS — I10: ICD-10-CM

## 2022-05-16 DIAGNOSIS — R30.0: Primary | ICD-10-CM

## 2022-05-16 DIAGNOSIS — M25.561: ICD-10-CM

## 2022-05-16 LAB
BACTERIA URNS QL MICRO: (no result) /HPF
CLARITY UR: (no result)
COLOR UR: (no result)
DEPRECATED RBC URNS MANUAL-ACNC: (no result) /HPF (ref 0–5)
EPI CELLS URNS QL MICRO: (no result) /LPF
KETONES UR QL STRIP.AUTO: (no result)
LEUKOCYTE ESTERASE UR QL STRIP.AUTO: (no result)
NITRITE UR QL STRIP.AUTO: POSITIVE
PROT UR QL STRIP.AUTO: (no result)
SP GR UR STRIP: 1.02 (ref 1.01–1.02)
UROBILINOGEN UR STRIP-MCNC: 0.2 MG/DL (ref 0.2–1)
WBC #/AREA URNS HPF: (no result) /HPF (ref 0–5)

## 2022-05-16 PROCEDURE — 87086 URINE CULTURE/COLONY COUNT: CPT

## 2022-05-16 PROCEDURE — 99282 EMERGENCY DEPT VISIT SF MDM: CPT

## 2022-05-16 PROCEDURE — 81001 URINALYSIS AUTO W/SCOPE: CPT

## 2022-05-16 PROCEDURE — 87186 SC STD MICRODIL/AGAR DIL: CPT

## 2022-11-15 ENCOUNTER — HOSPITAL ENCOUNTER (EMERGENCY)
Dept: HOSPITAL 88 - ER | Age: 72
LOS: 1 days | Discharge: HOME | End: 2022-11-16
Payer: MEDICARE

## 2022-11-15 VITALS — HEIGHT: 60 IN | WEIGHT: 170 LBS | BODY MASS INDEX: 33.38 KG/M2

## 2022-11-15 DIAGNOSIS — M25.561: ICD-10-CM

## 2022-11-15 DIAGNOSIS — G89.29: ICD-10-CM

## 2022-11-15 DIAGNOSIS — R10.30: Primary | ICD-10-CM

## 2022-11-15 DIAGNOSIS — M06.9: ICD-10-CM

## 2022-11-15 DIAGNOSIS — I10: ICD-10-CM

## 2022-11-15 DIAGNOSIS — R11.2: ICD-10-CM

## 2022-11-15 DIAGNOSIS — Z20.822: ICD-10-CM

## 2022-11-15 DIAGNOSIS — M25.562: ICD-10-CM

## 2022-11-15 LAB
ALBUMIN SERPL-MCNC: 3.7 G/DL (ref 3.5–5)
ALBUMIN/GLOB SERPL: 1.3 {RATIO} (ref 0.8–2)
ALP SERPL-CCNC: 49 IU/L (ref 40–150)
ALT SERPL-CCNC: 16 IU/L (ref 0–55)
ANION GAP SERPL CALC-SCNC: 18 MMOL/L (ref 8–16)
BASOPHILS # BLD AUTO: 0 10*3/UL (ref 0–0.1)
BASOPHILS NFR BLD AUTO: 0.3 % (ref 0–1)
BUN SERPL-MCNC: 21 MG/DL (ref 7–26)
BUN/CREAT SERPL: 24 (ref 6–25)
CALCIUM SERPL-MCNC: 9.2 MG/DL (ref 8.4–10.2)
CHLORIDE SERPL-SCNC: 93 MMOL/L (ref 98–107)
CO2 SERPL-SCNC: 24 MMOL/L (ref 22–29)
DEPRECATED APTT PLAS QN: 25.2 SECONDS (ref 23.8–35.5)
DEPRECATED INR PLAS: 0.9
DEPRECATED NEUTROPHILS # BLD AUTO: 10.9 10*3/UL (ref 2.1–6.9)
EOSINOPHIL # BLD AUTO: 0 10*3/UL (ref 0–0.4)
EOSINOPHIL NFR BLD AUTO: 0.1 % (ref 0–6)
ERYTHROCYTE [DISTWIDTH] IN CORD BLOOD: 12.4 % (ref 11.7–14.4)
GLOBULIN PLAS-MCNC: 2.9 G/DL (ref 2.3–3.5)
GLUCOSE SERPLBLD-MCNC: 129 MG/DL (ref 74–118)
HCT VFR BLD AUTO: 32.6 % (ref 34.2–44.1)
HGB BLD-MCNC: 10.9 G/DL (ref 12–16)
LYMPHOCYTES # BLD: 0.8 10*3/UL (ref 1–3.2)
LYMPHOCYTES NFR BLD AUTO: 6.9 % (ref 18–39.1)
MCH RBC QN AUTO: 33.9 PG (ref 28–32)
MCHC RBC AUTO-ENTMCNC: 33.4 G/DL (ref 31–35)
MCV RBC AUTO: 101.2 FL (ref 81–99)
MONOCYTES # BLD AUTO: 0.3 10*3/UL (ref 0.2–0.8)
MONOCYTES NFR BLD AUTO: 2.5 % (ref 4.4–11.3)
NEUTS SEG NFR BLD AUTO: 89.8 % (ref 38.7–80)
PLATELET # BLD AUTO: 266 X10E3/UL (ref 140–360)
POTASSIUM SERPL-SCNC: 4 MMOL/L (ref 3.5–5.1)
PROTHROMBIN TIME: 13 SECONDS (ref 11.9–14.5)
RBC # BLD AUTO: 3.22 X10E6/UL (ref 3.6–5.1)
SODIUM SERPL-SCNC: 131 MMOL/L (ref 136–145)

## 2022-11-15 PROCEDURE — 87040 BLOOD CULTURE FOR BACTERIA: CPT

## 2022-11-15 PROCEDURE — 36415 COLL VENOUS BLD VENIPUNCTURE: CPT

## 2022-11-15 PROCEDURE — 83518 IMMUNOASSAY DIPSTICK: CPT

## 2022-11-15 PROCEDURE — 84484 ASSAY OF TROPONIN QUANT: CPT

## 2022-11-15 PROCEDURE — 99284 EMERGENCY DEPT VISIT MOD MDM: CPT

## 2022-11-15 PROCEDURE — 74177 CT ABD & PELVIS W/CONTRAST: CPT

## 2022-11-15 PROCEDURE — 85730 THROMBOPLASTIN TIME PARTIAL: CPT

## 2022-11-15 PROCEDURE — 85610 PROTHROMBIN TIME: CPT

## 2022-11-15 PROCEDURE — 83605 ASSAY OF LACTIC ACID: CPT

## 2022-11-15 PROCEDURE — 71260 CT THORAX DX C+: CPT

## 2022-11-15 PROCEDURE — 87070 CULTURE OTHR SPECIMN AEROBIC: CPT

## 2022-11-15 PROCEDURE — 80307 DRUG TEST PRSMV CHEM ANLYZR: CPT

## 2022-11-15 PROCEDURE — 71045 X-RAY EXAM CHEST 1 VIEW: CPT

## 2022-11-15 PROCEDURE — 83690 ASSAY OF LIPASE: CPT

## 2022-11-15 PROCEDURE — 81001 URINALYSIS AUTO W/SCOPE: CPT

## 2022-11-15 PROCEDURE — 83880 ASSAY OF NATRIURETIC PEPTIDE: CPT

## 2022-11-15 PROCEDURE — 85025 COMPLETE CBC W/AUTO DIFF WBC: CPT

## 2022-11-15 PROCEDURE — 87086 URINE CULTURE/COLONY COUNT: CPT

## 2022-11-15 PROCEDURE — 80053 COMPREHEN METABOLIC PANEL: CPT

## 2022-11-15 PROCEDURE — 87400 INFLUENZA A/B EACH AG IA: CPT

## 2022-11-15 PROCEDURE — 93005 ELECTROCARDIOGRAM TRACING: CPT

## 2022-11-16 VITALS — DIASTOLIC BLOOD PRESSURE: 95 MMHG | SYSTOLIC BLOOD PRESSURE: 119 MMHG

## 2022-11-16 LAB
AMPHETAMINES UR QL SCN>1000 NG/ML: NEGATIVE
BACTERIA URNS QL MICRO: (no result) /HPF
BENZODIAZ UR QL SCN: NEGATIVE
CLARITY UR: CLEAR
COLOR UR: YELLOW
DEPRECATED RBC URNS MANUAL-ACNC: (no result) /HPF (ref 0–5)
EPI CELLS URNS QL MICRO: (no result) /LPF
FLUAV + FLUBV AG SPEC IF: NEGATIVE
HYALINE CASTS #/AREA URNS LPF: (no result) /[LPF] (ref 0–1)
KETONES UR QL STRIP.AUTO: NEGATIVE
LEUKOCYTE ESTERASE UR QL STRIP.AUTO: NEGATIVE
LIPASE SERPL-CCNC: 42 U/L (ref 8–78)
NITRITE UR QL STRIP.AUTO: NEGATIVE
PCP UR QL SCN: NEGATIVE
PROT UR QL STRIP.AUTO: NEGATIVE
S PYO AG THROAT QL: NEGATIVE
SP GR UR STRIP: 1.01 (ref 1.01–1.02)
UROBILINOGEN UR STRIP-MCNC: 0.2 MG/DL (ref 0.2–1)
WBC #/AREA URNS HPF: (no result) /HPF (ref 0–5)